# Patient Record
Sex: MALE | Race: WHITE | Employment: FULL TIME | URBAN - METROPOLITAN AREA
[De-identification: names, ages, dates, MRNs, and addresses within clinical notes are randomized per-mention and may not be internally consistent; named-entity substitution may affect disease eponyms.]

---

## 2018-10-04 ENCOUNTER — OFFICE VISIT (OUTPATIENT)
Dept: FAMILY MEDICINE CLINIC | Age: 33
End: 2018-10-04
Payer: COMMERCIAL

## 2018-10-04 ENCOUNTER — HOSPITAL ENCOUNTER (OUTPATIENT)
Age: 33
Discharge: HOME | End: 2018-10-06
Payer: COMMERCIAL

## 2018-10-04 VITALS
RESPIRATION RATE: 12 BRPM | HEIGHT: 71 IN | DIASTOLIC BLOOD PRESSURE: 76 MMHG | TEMPERATURE: 98.4 F | WEIGHT: 207 LBS | OXYGEN SATURATION: 95 % | HEART RATE: 69 BPM | BODY MASS INDEX: 28.98 KG/M2 | SYSTOLIC BLOOD PRESSURE: 134 MMHG

## 2018-10-04 DIAGNOSIS — E10.9 TYPE 1 DIABETES MELLITUS ON INSULIN THERAPY (HCC): ICD-10-CM

## 2018-10-04 DIAGNOSIS — E11.9 ENCOUNTER FOR DIABETIC FOOT EXAM (HCC): ICD-10-CM

## 2018-10-04 DIAGNOSIS — Z13.220 LIPID SCREENING: ICD-10-CM

## 2018-10-04 DIAGNOSIS — E10.9 TYPE 1 DIABETES MELLITUS ON INSULIN THERAPY (HCC): Primary | ICD-10-CM

## 2018-10-04 DIAGNOSIS — M25.521 RIGHT ELBOW PAIN: ICD-10-CM

## 2018-10-04 LAB
ALBUMIN SERPL-MCNC: 4.5 G/DL (ref 3.5–5.2)
ALP BLD-CCNC: 87 U/L (ref 40–129)
ALT SERPL-CCNC: 16 U/L (ref 0–40)
ANION GAP SERPL CALCULATED.3IONS-SCNC: 14 MMOL/L (ref 7–16)
AST SERPL-CCNC: 17 U/L (ref 0–39)
BILIRUB SERPL-MCNC: 0.8 MG/DL (ref 0–1.2)
BILIRUBIN DIRECT: <0.2 MG/DL (ref 0–0.3)
BILIRUBIN, INDIRECT: NORMAL MG/DL (ref 0–1)
BUN BLDV-MCNC: 15 MG/DL (ref 6–20)
CALCIUM SERPL-MCNC: 10.1 MG/DL (ref 8.6–10.2)
CHLORIDE BLD-SCNC: 99 MMOL/L (ref 98–107)
CHOLESTEROL, TOTAL: 204 MG/DL (ref 0–199)
CO2: 24 MMOL/L (ref 22–29)
CREAT SERPL-MCNC: 0.7 MG/DL (ref 0.7–1.2)
CREATININE URINE: 99 MG/DL (ref 40–278)
GFR AFRICAN AMERICAN: >60
GFR NON-AFRICAN AMERICAN: >60 ML/MIN/1.73
GLUCOSE BLD-MCNC: 182 MG/DL (ref 74–109)
HBA1C MFR BLD: 9.5 % (ref 4–5.6)
HDLC SERPL-MCNC: 48 MG/DL
LDL CHOLESTEROL CALCULATED: 127 MG/DL (ref 0–99)
MICROALBUMIN UR-MCNC: 16.8 MG/L
MICROALBUMIN/CREAT UR-RTO: 17 (ref 0–30)
POTASSIUM SERPL-SCNC: 4.1 MMOL/L (ref 3.5–5)
SODIUM BLD-SCNC: 137 MMOL/L (ref 132–146)
TOTAL PROTEIN: 7.5 G/DL (ref 6.4–8.3)
TRIGL SERPL-MCNC: 143 MG/DL (ref 0–149)
VLDLC SERPL CALC-MCNC: 29 MG/DL

## 2018-10-04 PROCEDURE — 99203 OFFICE O/P NEW LOW 30 MIN: CPT | Performed by: FAMILY MEDICINE

## 2018-10-04 PROCEDURE — 80048 BASIC METABOLIC PNL TOTAL CA: CPT

## 2018-10-04 PROCEDURE — 36415 COLL VENOUS BLD VENIPUNCTURE: CPT | Performed by: FAMILY MEDICINE

## 2018-10-04 PROCEDURE — 82044 UR ALBUMIN SEMIQUANTITATIVE: CPT

## 2018-10-04 PROCEDURE — 80076 HEPATIC FUNCTION PANEL: CPT

## 2018-10-04 PROCEDURE — 82570 ASSAY OF URINE CREATININE: CPT

## 2018-10-04 PROCEDURE — 80061 LIPID PANEL: CPT

## 2018-10-04 PROCEDURE — 83036 HEMOGLOBIN GLYCOSYLATED A1C: CPT

## 2018-10-04 RX ORDER — ARM BRACE
EACH MISCELLANEOUS
Qty: 1 EACH | Refills: 0 | Status: SHIPPED | OUTPATIENT
Start: 2018-10-04 | End: 2018-11-06

## 2018-10-04 ASSESSMENT — PATIENT HEALTH QUESTIONNAIRE - PHQ9
SUM OF ALL RESPONSES TO PHQ QUESTIONS 1-9: 2
SUM OF ALL RESPONSES TO PHQ9 QUESTIONS 1 & 2: 2
2. FEELING DOWN, DEPRESSED OR HOPELESS: 1
SUM OF ALL RESPONSES TO PHQ QUESTIONS 1-9: 2
1. LITTLE INTEREST OR PLEASURE IN DOING THINGS: 1

## 2018-10-04 NOTE — PROGRESS NOTES
History and Physical    Patient:   29 y.o. male MRN: <[id number]>     Date of Service: 10/4/2018      Chief complaint:    History of Present Illness   The patient is a 29 y.o. male    Chief Complaint   Patient presents with    Establish Care    Elbow Pain     x6 months     CC:DM -states that he was previously diagnosed with diabetes , c/o right elbow pain   HPI:  Diagnosed with diabetes when younger - he states he takes OTC insulin 70/30 mix  50 am and 50 pm   He states that he is also taking the OTC short acting insulin R -takes 20-25 units with meals -uses 1-2 times/day        here for care -previously a patient at [facility] saw PCP about two years ago    He states he is using a sliding scale his former endocrinologist, Dr Stanislav Beth started him on when diagnosed with juvenile diabetes   Also saw Dr Anna Parish for care of his diabetes in the past   Hospitalized two yrs ago at [facility] for DKA   Has an older glucometer-did not bring the glucometer to the visit -not checking glucoses regularly Average range of sugars is 140-180 -he will occasionally have hypoglycemia     Right elbow pain -works as a  - since starting this job this past year he has had the right elbow pain -job involves repetitive motion with the right arm/elbow   No swelling or redness of the elbow or arm     Past Medical History:  DM type 1     Past Surgical History:    Appendectomy     Allergies:  Patient has no known allergies. Social History:   TOBACCO:   reports that he has never smoked. He has never used smokeless tobacco.  ETOH:   reports that he does not drink alcohol. Family History:    Mother with bipolar disorder  Father - hx of diverticulitis   Maternal GM -colon cancer     REVIEW OF SYSTEMS:     Review of Systems - General ROS: negative for - chills, fever or weight loss  Ophthalmic ROS: negative for - blurry vision, double vision, eye pain or loss of vision  ENT ROS: negative for - nasal congestion, nasal discharge or sinus

## 2018-10-04 NOTE — PATIENT INSTRUCTIONS
Patient Education        Elbow: Exercises  Your Care Instructions  Here are some examples of exercises for elbows. Start each exercise slowly. Ease off the exercise if you start to have pain. Your doctor or physical therapist will tell you when you can start these exercises and which ones will work best for you. How to do the exercises  Wrist flexor stretch    1. Extend your arm in front of you with your palm up. 2. Bend your wrist, pointing your hand toward the floor. 3. With your other hand, gently bend your wrist farther until you feel a mild to moderate stretch in your forearm. 4. Hold for at least 15 to 30 seconds. Repeat 2 to 4 times. Wrist extensor stretch    1. Repeat steps 1 to 4 of the stretch above but begin with your extended hand palm down. Ball or sock squeeze    1. Hold a tennis ball (or a rolled-up sock) in your hand. 2. Make a fist around the ball (or sock) and squeeze. 3. Hold for about 6 seconds, and then relax for up to 10 seconds. 4. Repeat 8 to 12 times. 5. Switch the ball (or sock) to your other hand and do 8 to 12 times. Wrist deviation    1. Sit so that your arm is supported but your hand hangs off the edge of a flat surface, such as a table. 2. Hold your hand out like you are shaking hands with someone. 3. Move your hand up and down. 4. Repeat this motion 8 to 12 times. 5. Switch arms. 6. Try to do this exercise twice with each hand. Wrist curls    1. Place your forearm on a table with your hand hanging over the edge of the table, palm up. 2. Place a 1- to 2-pound weight in your hand. This may be a dumbbell, a can of food, or a filled water bottle. 3. Slowly raise and lower the weight while keeping your forearm on the table and your palm facing up. 4. Repeat this motion 8 to 12 times. 5. Switch arms, and do steps 1 through 4.  6. Repeat with your hand facing down toward the floor. Switch arms. Biceps curls    1.  Sit leaning forward with your legs slightly spread and

## 2018-11-06 ENCOUNTER — OFFICE VISIT (OUTPATIENT)
Dept: ENDOCRINOLOGY | Age: 33
End: 2018-11-06
Payer: COMMERCIAL

## 2018-11-06 VITALS
DIASTOLIC BLOOD PRESSURE: 90 MMHG | TEMPERATURE: 98.2 F | BODY MASS INDEX: 30.49 KG/M2 | SYSTOLIC BLOOD PRESSURE: 146 MMHG | WEIGHT: 213 LBS | HEIGHT: 70 IN | HEART RATE: 93 BPM

## 2018-11-06 DIAGNOSIS — E10.9 TYPE 1 DIABETES MELLITUS WITHOUT COMPLICATION (HCC): Primary | ICD-10-CM

## 2018-11-06 PROCEDURE — 99204 OFFICE O/P NEW MOD 45 MIN: CPT | Performed by: INTERNAL MEDICINE

## 2018-11-06 NOTE — LETTER
GI: No N/V/D, no constipation, No abdominal pain, no melena or hematochezia   : Denied any dysuria, hematuria, flank pain, discharge, or incontinence. Skin: denied any rash, ulcer, Hirsute, or hyperpigmentation. MSK: denied any joint deformity, joint pain/swelling, muscle pain, or back pain. Neuro: no numbness, no tingling, no weakness, _  OBJECTIVE    There were no vitals taken for this visit. BP Readings from Last 4 Encounters:   10/04/18 134/76     Wt Readings from Last 6 Encounters:   10/04/18 207 lb (93.9 kg)       Physical examination:  General: awake alert, oriented x3, no abnormal position or movements. HEENT: normocephalic non-traumatic, no exophthalmos   Neck: supple, no LN enlargement, no thyromegaly, no thyroid tenderness, no JVD. Pulm: Clear equal air entry no added sounds, no wheezing or rhonchi    CVS: S1 + S2, no murmur, no heave. Dorsalis pedis pulse palpable   Abd: soft lax, no tenderness, no organomegaly, audible bowel sounds. Skin: warm, no lesions, no rash.  No callus, no Ulcers, No acanthosis nigricans   Neuro: CN intact, Monofilament sensation normal bilateral , muscle power normal  Psych: normal mood, and affect      Review of Laboratory Data:  I have reviewed the following:  No results found for: WBC, RBC, HGB, HCT, MCV, MCH, MCHC, RDW, PLT, MPV, GRANULOCYTES, BANDS   Lab Results   Component Value Date/Time     10/04/2018 12:00 PM    K 4.1 10/04/2018 12:00 PM    CO2 24 10/04/2018 12:00 PM    BUN 15 10/04/2018 12:00 PM    CALCIUM 10.1 10/04/2018 12:00 PM      No results found for: TSH, T4FREE, X3ZXTKO, FT3, J7SVSIE, TSI, TPOABS, THGAB  Lab Results   Component Value Date    LABA1C 9.5 10/04/2018    GLUCOSE 182 10/04/2018    MALBCR 17.0 10/04/2018    LABMICR 16.8 10/04/2018    LABCREA 99 10/04/2018     Lab Results   Component Value Date    CHOL 204 10/04/2018    TRIG 143 10/04/2018    HDL 48 10/04/2018     No results found for: VITD25

## 2018-11-06 NOTE — PROGRESS NOTES
ENDOCRINOLOGY CLINIC NOTE    Date of Service: 11/6/2018    Medical Records Reviewed:   Inpatient records, outpatient records, outside records     Care Team:  Primary Care Physician: Luann Reyes DO. Provider: Ariana Landry MD  Other provider(s):            Reason for the visit:  Type I DM     Type of Visit:  Initial visit     History of Present Illness: The history is provided by the patient. No  was used. Accuracy of the patient data is excellent. Dre Spicer is a very pleasant 29 y.o. male seen in Endocrine clinic today for diabetes management     Dre Spicer was diagnosed with diabetes at age 16   The patient is currently on Relion mixed insulin 70/30 60 units before breakfast, 60 units before supper   He also has humalin R insulin at home but he is not using   Over the past 3 months, Mr. Shaan Riojas has been checking blood sugar four times/day. Unfortunately because of insurance problem he wasn't able to afford basal bolus insulin regimen. The patient reported that since started mixed 70/30 insulin BS all over the place.  Most readings are high but also reported hypoglycemic episodes especially in the afternoon   10/4/2018: A1c 9.5  Patient has had no hypoglycemic episodes or symptoms recently   The patient has been mindful of what has been eating and following diabetic diet as encouraged  I reviewed current medications and the patient has no issues with diabetes medications  Last eye exam was 3 years ago   The patient seeing performs his own foot care  Microvascular complications:  No Retinopathy, Nephropathy or Neuropathy   Macrovascular complications: no CAD, PVD, or Stroke   Refuses Flushot      PAST MEDICAL HISTORY   Past Medical History:   Diagnosis Date    Diabetes mellitus type 1 ([address]      PAST SURGICAL HISTORY   Past Surgical History:   Procedure Laterality Date    APPENDECTOMY       SOCIAL HISTORY   Social History     Social History    Marital status: Life Partner     Spouse name: N/A    Number of children: N/A    Years of education: N/A     Occupational History    Not on file. Social History Main Topics    Smoking status: Never Smoker    Smokeless tobacco: Never Used    Alcohol use No    Drug use: No    Sexual activity: Yes     Partners: Female     Other Topics Concern    Not on file     Social History Narrative    No narrative on file     FAMILY HISTORY   Family History   Problem Relation Age of Onset    Cancer Maternal Grandmother      ALLERGIES AND DRUG REACTIONS   No Known Allergies    CURRENT MEDICATIONS     Current Outpatient Prescriptions   Medication Sig Dispense Refill    insulin regular (HUMULIN R;NOVOLIN R) 100 UNIT/ML injection Inject into the skin See Admin Instructions      Elastic Bandages & Supports (ACE ELBOW BRACE) MISC To wear on right elbow 1 each 0     No current facility-administered medications for this visit. Review of Systems  Constitutional: No fever, no chills, no diaphoresis, no generalized weakness. HEENT: No blurred vision, No sore throat, no ear pain, no hair loss  Neck: denied any neck swelling, difficulty swallowing,   Cardio-pulmonary: No CP, SOB or palpitation, No orthopnea or PND. No cough or wheezing. GI: No N/V/D, no constipation, No abdominal pain, no melena or hematochezia   : Denied any dysuria, hematuria, flank pain, discharge, or incontinence. Skin: denied any rash, ulcer, Hirsute, or hyperpigmentation. MSK: denied any joint deformity, joint pain/swelling, muscle pain, or back pain. Neuro: no numbness, no tingling, no weakness, _  OBJECTIVE    There were no vitals taken for this visit. BP Readings from Last 4 Encounters:   10/04/18 134/76     Wt Readings from Last 6 Encounters:   10/04/18 207 lb (93.9 kg)       Physical examination:  General: awake alert, oriented x3, no abnormal position or movements.   HEENT: normocephalic non-traumatic, no exophthalmos   Neck: supple, no LN pre-prandial, < 180 peak post-prandial  · The patient counseled about the complications of uncontrolled diabetes   · Will change diabetic regimen to   · Patient was counselled about the importance of self-blood glucose monitoring and eating consistent carb diet to avoid blood sugar fluctuations   · Patient will need routine diabetes maintenance and prevention. Referral for ophthalmology   · The patient was referred to diabetic educator for further teaching   · Discussed lifestyle changes including diet and exercise with patient; recommended 150 minutes of moderate intensity exercise per week. · Diabetes labs before next visit     Follow up  · 3 months     The above issues were reviewed with the patient who understood and agreed with the plan. 30 minutes were spent today in management of this patient. More than 50% of time spent on counseling of patient on above diagnosis. Thank you for allowing us to participate in the care of this patient. Please do not hesitate to contact us with any additional questions. Diagnosis Orders   1.  Type 1 diabetes mellitus without complication (HCC)  Basic Metabolic Panel    Hemoglobin A1C    Microalbumin / Creatinine Urine Ratio    Lipid Panel    External Referral To Ophthalmology       Jabier Goodman MD  Endocrinologist, [facility])   [address] [place] [address], [address] [place] [address],[facility] [id number]   Phone: [phone]  Fax: [phone]  --------------------------------------------  Electronically signed

## 2018-11-21 ENCOUNTER — TELEPHONE (OUTPATIENT)
Dept: ENDOCRINOLOGY | Age: 33
End: 2018-11-21

## 2018-11-21 NOTE — TELEPHONE ENCOUNTER
Pt called his insurance. A 90day supply of Basaglar and Humalog are preferred by his insurance. He stated that you wanted him to check so that he can be switched.

## 2019-10-23 ENCOUNTER — TELEPHONE (OUTPATIENT)
Dept: ENDOCRINOLOGY | Age: 34
End: 2019-10-23

## 2019-11-19 ENCOUNTER — TELEPHONE (OUTPATIENT)
Dept: ADMINISTRATIVE | Age: 34
End: 2019-11-19

## 2019-11-19 DIAGNOSIS — E10.9 TYPE 1 DIABETES MELLITUS WITHOUT COMPLICATION (HCC): Primary | ICD-10-CM

## 2020-01-30 ENCOUNTER — OFFICE VISIT (OUTPATIENT)
Dept: ENDOCRINOLOGY | Age: 35
End: 2020-01-30
Payer: COMMERCIAL

## 2020-01-30 VITALS
RESPIRATION RATE: 16 BRPM | OXYGEN SATURATION: 98 % | HEIGHT: 71 IN | HEART RATE: 92 BPM | WEIGHT: 193 LBS | SYSTOLIC BLOOD PRESSURE: 124 MMHG | BODY MASS INDEX: 27.02 KG/M2 | DIASTOLIC BLOOD PRESSURE: 70 MMHG

## 2020-01-30 PROBLEM — Z91.199 NON-COMPLIANT PATIENT: Status: ACTIVE | Noted: 2020-01-30

## 2020-01-30 PROBLEM — E55.9 VITAMIN D DEFICIENCY: Status: ACTIVE | Noted: 2020-01-30

## 2020-01-30 LAB — HBA1C MFR BLD: 10.8 %

## 2020-01-30 PROCEDURE — 99214 OFFICE O/P EST MOD 30 MIN: CPT | Performed by: INTERNAL MEDICINE

## 2020-01-30 PROCEDURE — 83036 HEMOGLOBIN GLYCOSYLATED A1C: CPT | Performed by: INTERNAL MEDICINE

## 2020-01-30 RX ORDER — GLUCOSAMINE HCL/CHONDROITIN SU 500-400 MG
CAPSULE ORAL
Qty: 250 STRIP | Refills: 5 | Status: SHIPPED
Start: 2020-01-30 | End: 2020-05-07 | Stop reason: SDUPTHER

## 2020-01-30 RX ORDER — LANCETS 30 GAUGE
1 EACH MISCELLANEOUS 4 TIMES DAILY
Qty: 300 EACH | Refills: 5 | Status: SHIPPED
Start: 2020-01-30 | End: 2022-10-04 | Stop reason: ALTCHOICE

## 2020-01-30 RX ORDER — INSULIN LISPRO 100 [IU]/ML
INJECTION, SOLUTION INTRAVENOUS; SUBCUTANEOUS
Qty: 15 PEN | Refills: 0 | Status: SHIPPED
Start: 2020-01-30 | End: 2020-02-20

## 2020-01-30 NOTE — PROGRESS NOTES
History:   Diagnosis Date    Diabetes mellitus type 1 ([address]      PAST SURGICAL HISTORY   Past Surgical History:   Procedure Laterality Date    APPENDECTOMY       SOCIAL HISTORY   Social History     Socioeconomic History    Marital status: Life Partner     Spouse name: Not on file    Number of children: Not on file    Years of education: Not on file    Highest education level: Not on file   Occupational History    Not on file   Social Needs    Financial resource strain: Not on file    Food insecurity:     Worry: Not on file     Inability: Not on file    Transportation needs:     Medical: Not on file     Non-medical: Not on file   Tobacco Use    Smoking status: Never Smoker    Smokeless tobacco: Never Used   Substance and Sexual Activity    Alcohol use: No    Drug use: No    Sexual activity: Yes     Partners: Female   Lifestyle    Physical activity:     Days per week: Not on file     Minutes per session: Not on file    Stress: Not on file   Relationships    Social connections:     Talks on phone: Not on file     Gets together: Not on file     Attends Taoism service: Not on file     Active member of club or organization: Not on file     Attends meetings of clubs or organizations: Not on file     Relationship status: Not on file    Intimate partner violence:     Fear of current or ex partner: Not on file     Emotionally abused: Not on file     Physically abused: Not on file     Forced sexual activity: Not on file   Other Topics Concern    Not on file   Social History Narrative    Not on file     FAMILY HISTORY   Family History   Problem Relation Age of Onset    Cancer Maternal Grandmother     Hypertension Maternal Grandmother     Diabetes Paternal Grandfather      ALLERGIES AND DRUG REACTIONS   No Known Allergies    CURRENT MEDICATIONS     Current Outpatient Medications   Medication Sig Dispense Refill    insulin glargine (BASAGLAR KWIKPEN) 100 UNIT/ML injection pen 45 units at bedtime 15 pen 0    insulin lispro, 1 Unit Dial, (HUMALOG KWIKPEN) 100 UNIT/ML SOPN Inject 10 units with meals + sliding scale. MAX 50 units daily 15 pen 0    blood glucose monitor strips One-Touch Verio strips. Checks 4 times/day before meals and at bedtime and as needed for symptoms of irregular blood glucose 250 strip 5    Lancets MISC 1 each by Does not apply route 4 times daily 300 each 5     No current facility-administered medications for this visit. Review of Systems  Constitutional: No fever, no chills, no diaphoresis, no generalized weakness. HEENT: No blurred vision, No sore throat, no ear pain, no hair loss  Neck: denied any neck swelling, difficulty swallowing,   Cardio-pulmonary: No CP, SOB or palpitation, No orthopnea or PND. No cough or wheezing. GI: No N/V/D, no constipation, No abdominal pain, no melena or hematochezia   : Denied any dysuria, hematuria, flank pain, discharge, or incontinence. Skin: denied any rash, ulcer, Hirsute, or hyperpigmentation. MSK: denied any joint deformity, joint pain/swelling, muscle pain, or back pain. Neuro: no numbness, no tingling, no weakness, _  OBJECTIVE    /70 (Site: Left Upper Arm, Position: Sitting, Cuff Size: Medium Adult)   Pulse 92   Resp 16   Ht 5' 11\" (1.803 m)   Wt 193 lb (87.5 kg)   SpO2 98%   BMI 26.92 kg/m²   BP Readings from Last 4 Encounters:   01/30/20 124/70   11/06/18 (!) 146/90   10/04/18 134/76     Wt Readings from Last 6 Encounters:   01/30/20 193 lb (87.5 kg)   11/06/18 213 lb (96.6 kg)   10/04/18 207 lb (93.9 kg)       Physical examination:  General: awake alert, oriented x3, no abnormal position or movements. HEENT: normocephalic non-traumatic, no exophthalmos   Neck: supple, no LN enlargement, no thyromegaly, no thyroid tenderness, no JVD. Pulm: Clear equal air entry no added sounds, no wheezing or rhonchi    CVS: S1 + S2, no murmur, no heave.  Dorsalis pedis pulse palpable   Abd: soft lax, no tenderness, no organomegaly, audible bowel sounds. Skin: warm, no lesions, no rash. No callus, no Ulcers, No acanthosis nigricans   Neuro: CN intact, Monofilament sensation normal bilateral , muscle power normal  Psych: normal mood, and affect      Review of Laboratory Data:  I have reviewed the following:  No results found for: WBC, RBC, HGB, HCT, MCV, MCH, MCHC, RDW, PLT, MPV, GRANULOCYTES, BANDS   Lab Results   Component Value Date/Time     10/04/2018 12:00 PM    K 4.1 10/04/2018 12:00 PM    CO2 24 10/04/2018 12:00 PM    BUN 15 10/04/2018 12:00 PM    CALCIUM 10.1 10/04/2018 12:00 PM      No results found for: TSH, T4FREE, S7SJFBB, FT3, L2TABUO, TSI, TPOABS, THGAB  Lab Results   Component Value Date    LABA1C 10.8 01/30/2020    GLUCOSE 182 10/04/2018    MALBCR 17.0 10/04/2018    LABMICR 16.8 10/04/2018    LABCREA 99 10/04/2018     Lab Results   Component Value Date    CHOL 204 10/04/2018    TRIG 143 10/04/2018    HDL 48 10/04/2018     No results found for: VITD25    Medical Records/Labs/Images review:   I personally reviewed and summarized previous records   All labs were reviewed independently     [address] Jones, a 36 y.o.-old male seen in for the following issues     Diabetes Mellitus Type 1   · Patient's diabetes is uncontrolled. A1c  10.5%  · Will continue basal bolus insulin regimen. Patient understands that he is to write down his blood sugar readings and return with them in the next several weeks so adjustments can be made to his regimen. Diabetic testing supplies will be reordered for patient so there is no question that he is able to complete this task. · Take Basaglar 30 45 units daily, Humalog 10 units before meals + ss 2:50>150  · Advised to check blood sugars 4 times a day before meals and at bedtime and fax the results to our office in a week.    · Discussed with patient A1c and blood sugar goals   · Optimal blood sugars: 100-140 pre-prandial, < 180 peak post-prandial  · The patient counseled about the complications of uncontrolled diabetes   · Patient was counselled about the importance of self-blood glucose monitoring and eating consistent carb diet to avoid blood sugar fluctuations   · Patient will need routine diabetes maintenance and prevention. Referral for ophthalmology   · The patient was referred to diabetic educator for further teaching   · Discussed lifestyle changes including diet and exercise with patient; recommended 150 minutes of moderate intensity exercise per week. · Diabetes labs before next visit, including BMP and microalbumin/creatinine ratio. noncompliance   Discussed the importance of keeping his appointments (last visit with us was 11/2018)    Discussed with patient the importance of eating consistent carbohydrate meals, avoiding high glycemic index food. Also, discussed with patient the risk and negative consequences of dietary noncompliance on blood glucose control, blood pressure and weight    vitD deficiency   Encourage taking vitD 2000 U/day     Return in about 3 months (around 4/30/2020) for DM type 1 . The above issues were reviewed with the patient who understood and agreed with the plan. Thank you for allowing us to participate in the care of this patient. Please do not hesitate to contact us with any additional questions. Diagnosis Orders   1. Type 1 diabetes mellitus without complication (HCC)  POCT glycosylated hemoglobin (Hb A1C)    Basic Metabolic Panel    Hemoglobin A1C    Microalbumin / Creatinine Urine Ratio    insulin glargine (BASAGLAR KWIKPEN) 100 UNIT/ML injection pen    insulin lispro, 1 Unit Dial, (HUMALOG KWIKPEN) 100 UNIT/ML SOPN    blood glucose monitor strips   2. Vitamin D deficiency  Vitamin D 25 Hydroxy   3.  Non-compliant patient         Edward Gregory MD  Endocrinologist, [facility])   [facility] [address] [facility] [address]   Phone: [phone]  Fax: [phone]  --------------------------------------------  Electronically signed

## 2020-02-20 RX ORDER — INSULIN LISPRO 100 [IU]/ML
INJECTION, SOLUTION INTRAVENOUS; SUBCUTANEOUS
Qty: 15 ML | Refills: 5 | Status: SHIPPED
Start: 2020-02-20 | End: 2020-02-27

## 2020-02-21 ENCOUNTER — TELEPHONE (OUTPATIENT)
Dept: ENDOCRINOLOGY | Age: 35
End: 2020-02-21

## 2020-02-21 NOTE — TELEPHONE ENCOUNTER
See attached bs log       Humalog KwikPen 100 unit/ml 10 units subq with meals plus ss  Basaglar Kwikpen 100 unit/ injection pen 45 units qhs

## 2020-02-27 RX ORDER — INSULIN LISPRO 100 [IU]/ML
INJECTION, SOLUTION INTRAVENOUS; SUBCUTANEOUS
Qty: 15 PEN | Refills: 5 | Status: SHIPPED
Start: 2020-02-27 | End: 2020-05-07 | Stop reason: SDUPTHER

## 2020-02-27 RX ORDER — INSULIN LISPRO 100 [IU]/ML
INJECTION, SOLUTION INTRAVENOUS; SUBCUTANEOUS
Qty: 15 ML | Refills: 5 | Status: SHIPPED
Start: 2020-02-27 | End: 2020-02-27 | Stop reason: SDUPTHER

## 2020-05-07 ENCOUNTER — VIRTUAL VISIT (OUTPATIENT)
Dept: ENDOCRINOLOGY | Age: 35
End: 2020-05-07
Payer: COMMERCIAL

## 2020-05-07 PROCEDURE — 99214 OFFICE O/P EST MOD 30 MIN: CPT | Performed by: INTERNAL MEDICINE

## 2020-05-07 RX ORDER — INSULIN LISPRO 100 [IU]/ML
INJECTION, SOLUTION INTRAVENOUS; SUBCUTANEOUS
Qty: 30 PEN | Refills: 3 | Status: SHIPPED
Start: 2020-05-07 | End: 2020-10-08 | Stop reason: SDUPTHER

## 2020-05-07 RX ORDER — PEN NEEDLE, DIABETIC 32GX 5/32"
NEEDLE, DISPOSABLE MISCELLANEOUS
Qty: 250 EACH | Refills: 5 | Status: SHIPPED
Start: 2020-05-07 | End: 2020-10-08 | Stop reason: SDUPTHER

## 2020-05-07 RX ORDER — INSULIN GLARGINE 100 [IU]/ML
INJECTION, SOLUTION SUBCUTANEOUS
Qty: 25 PEN | Refills: 3 | Status: SHIPPED
Start: 2020-05-07 | End: 2020-10-08 | Stop reason: SDUPTHER

## 2020-05-07 RX ORDER — GLUCOSAMINE HCL/CHONDROITIN SU 500-400 MG
CAPSULE ORAL
Qty: 250 STRIP | Refills: 5 | Status: SHIPPED
Start: 2020-05-07 | End: 2020-10-08 | Stop reason: SDUPTHER

## 2020-05-07 NOTE — PROGRESS NOTES
ENDOCRINOLOGY CLINIC NOTE    Date of Service: 5/7/2020    Primary Care Physician: Erna Villanueva DO. Provider: Rubén Weaver MD    Reason for the visit:  Type I DM     History of Present Illness: The history is provided by the patient. No  was used. Accuracy of the patient data is excellent. Dre George is a very pleasant 36 y.o. male seen today for diabetes management     Dre Kirkland was diagnosed with diabetes at age 16   The patient is currently on basaglar 61 , Humalog 25 units + ss 3:50>150  He has been checking BS 1-2  times a day and missing insulin doses frequently. Per pt  most readings 160-250  Lab Results   Component Value Date    LABA1C 10.8 01/30/2020    LABA1C 9.5 10/04/2018     The patient has not been mindful of what has been eating and following diabetic diet as encouraged  I reviewed current medications and the patient has no issues with diabetes medications  Last eye exam was multiple years ago; patient recently was given by insurance from his job. The patient seeing his without a podiatrist and performs his own foot care  Microvascular complications:  No Retinopathy, Nephropathy or Neuropathy   Macrovascular complications: no CAD, PVD, or Stroke   Refuses Flushot      PAST MEDICAL HISTORY   Past Medical History:   Diagnosis Date    Diabetes mellitus type 1 ([address]      PAST SURGICAL HISTORY   Past Surgical History:   Procedure Laterality Date    APPENDECTOMY       SOCIAL HISTORY   Tobacco:   reports that he has never smoked. He has never used smokeless tobacco.  Alcol:   reports no history of alcohol use. Illicit Drugs:   reports no history of drug use.     FAMILY HISTORY   Family History   Problem Relation Age of Onset    Cancer Maternal Grandmother     Hypertension Maternal Grandmother     Diabetes Paternal Grandfather      ALLERGIES AND DRUG REACTIONS   No Known Allergies    CURRENT MEDICATIONS     Current Outpatient Medications   Medication Sig mass. No obvious neck deformity     CVS: no distress   Chest: no distress. Chest is moving with respiration    Extremities:  no visible tremor  Skin: No visible rashes as seen from camera   Musculoskeletal: no visible deformity  Neuro: Alert and oriented to person, place, and time. Psychiatric: Normal mood and affect. Behavior is normal    Review of Laboratory Data:  I have reviewed the following:  No results found for: WBC, RBC, HGB, HCT, MCV, MCH, MCHC, RDW, PLT, MPV, GRANULOCYTES, BANDS   Lab Results   Component Value Date/Time     10/04/2018 12:00 PM    K 4.1 10/04/2018 12:00 PM    CO2 24 10/04/2018 12:00 PM    BUN 15 10/04/2018 12:00 PM    CALCIUM 10.1 10/04/2018 12:00 PM      No results found for: TSH, T4FREE, Y9PEUHI, FT3, H0HDNTC, TSI, TPOABS, THGAB  Lab Results   Component Value Date    LABA1C 10.8 01/30/2020    GLUCOSE 182 10/04/2018    MALBCR 17.0 10/04/2018    LABMICR 16.8 10/04/2018    LABCREA 99 10/04/2018     Lab Results   Component Value Date    CHOL 204 10/04/2018    TRIG 143 10/04/2018    HDL 48 10/04/2018     No results found for: VITD25    Medical Records/Labs/Images review:   I personally reviewed and summarized previous records   All labs were reviewed independently     1814 Daiana Jones, a 36 y.o.-old male seen in for the following issues     Diabetes Mellitus Type 1   · Patient's diabetes is uncontrolled. A1c  10.5%, due to poor compliance with diet and insulin therapy   · Discussed the importance of taking insulin as prescribed and following diabetic diet   · continue  basaglar 60 U daily , Humalog 25 units + ss 3:50>150  · Advised to check blood sugars 4 times a day before meals and at bedtime and fax the results to our office in a week.    · Discussed with patient A1c and blood sugar goals   · Optimal blood sugars: 100-140 pre-prandial, < 180 peak post-prandial  · The patient counseled about the complications of uncontrolled diabetes   · Patient was

## 2020-05-07 NOTE — PROGRESS NOTES
Dre Colon was read the following message We want to confirm that, for purposes of billing, this is a virtual visit with your provider for which we will submit a claim for reimbursement with your insurance company. You will be responsible for any copays, coinsurance amounts or other amounts not covered by your insurance company. If you do not accept this, unfortunately we will not be able to schedule a virtual visit with the provider. Do you accept?  Dre DORADO responded YES

## 2020-07-21 ENCOUNTER — APPOINTMENT (OUTPATIENT)
Dept: CT IMAGING | Age: 35
End: 2020-07-21
Payer: COMMERCIAL

## 2020-07-21 ENCOUNTER — HOSPITAL ENCOUNTER (EMERGENCY)
Age: 35
Discharge: HOME | End: 2020-07-22
Attending: EMERGENCY MEDICINE
Payer: COMMERCIAL

## 2020-07-21 VITALS
RESPIRATION RATE: 16 BRPM | WEIGHT: 210 LBS | HEIGHT: 71 IN | TEMPERATURE: 97.3 F | HEART RATE: 88 BPM | BODY MASS INDEX: 29.4 KG/M2 | DIASTOLIC BLOOD PRESSURE: 94 MMHG | OXYGEN SATURATION: 97 % | SYSTOLIC BLOOD PRESSURE: 176 MMHG

## 2020-07-21 LAB
ALBUMIN SERPL-MCNC: 4.3 G/DL (ref 3.5–5.2)
ALP BLD-CCNC: 79 U/L (ref 40–129)
ALT SERPL-CCNC: 15 U/L (ref 0–40)
ANION GAP SERPL CALCULATED.3IONS-SCNC: 19 MMOL/L (ref 7–16)
AST SERPL-CCNC: 20 U/L (ref 0–39)
BACTERIA: ABNORMAL /HPF
BASOPHILS ABSOLUTE: 0.07 E9/L (ref 0–0.2)
BASOPHILS RELATIVE PERCENT: 0.4 % (ref 0–2)
BETA-HYDROXYBUTYRATE: 1.55 MMOL/L (ref 0.02–0.27)
BILIRUB SERPL-MCNC: 2.1 MG/DL (ref 0–1.2)
BILIRUBIN URINE: ABNORMAL
BLOOD, URINE: NEGATIVE
BUN BLDV-MCNC: 21 MG/DL (ref 6–20)
CALCIUM SERPL-MCNC: 9.8 MG/DL (ref 8.6–10.2)
CHLORIDE BLD-SCNC: 94 MMOL/L (ref 98–107)
CHP ED QC CHECK: YES
CLARITY: ABNORMAL
CO2: 23 MMOL/L (ref 22–29)
COLOR: ABNORMAL
CREAT SERPL-MCNC: 0.8 MG/DL (ref 0.7–1.2)
EOSINOPHILS ABSOLUTE: 0.04 E9/L (ref 0.05–0.5)
EOSINOPHILS RELATIVE PERCENT: 0.2 % (ref 0–6)
GFR AFRICAN AMERICAN: >60
GFR NON-AFRICAN AMERICAN: >60 ML/MIN/1.73
GLUCOSE BLD-MCNC: 233 MG/DL
GLUCOSE BLD-MCNC: 260 MG/DL (ref 74–99)
GLUCOSE URINE: 100 MG/DL
HCT VFR BLD CALC: 45.5 % (ref 37–54)
HEMOGLOBIN: 16.3 G/DL (ref 12.5–16.5)
IMMATURE GRANULOCYTES #: 0.11 E9/L
IMMATURE GRANULOCYTES %: 0.6 % (ref 0–5)
KETONES, URINE: >=80 MG/DL
LEUKOCYTE ESTERASE, URINE: NEGATIVE
LIPASE: 46 U/L (ref 13–60)
LYMPHOCYTES ABSOLUTE: 2.37 E9/L (ref 1.5–4)
LYMPHOCYTES RELATIVE PERCENT: 12.8 % (ref 20–42)
MCH RBC QN AUTO: 30.8 PG (ref 26–35)
MCHC RBC AUTO-ENTMCNC: 35.8 % (ref 32–34.5)
MCV RBC AUTO: 85.8 FL (ref 80–99.9)
METER GLUCOSE: 223 MG/DL (ref 74–99)
MONOCYTES ABSOLUTE: 1.42 E9/L (ref 0.1–0.95)
MONOCYTES RELATIVE PERCENT: 7.7 % (ref 2–12)
MUCUS: PRESENT /LPF
NEUTROPHILS ABSOLUTE: 14.51 E9/L (ref 1.8–7.3)
NEUTROPHILS RELATIVE PERCENT: 78.3 % (ref 43–80)
NITRITE, URINE: NEGATIVE
PDW BLD-RTO: 12.2 FL (ref 11.5–15)
PH UA: 6 (ref 5–9)
PH VENOUS: 7.53 (ref 7.35–7.45)
PLATELET # BLD: 216 E9/L (ref 130–450)
PMV BLD AUTO: 11.6 FL (ref 7–12)
POTASSIUM SERPL-SCNC: 3.4 MMOL/L (ref 3.5–5)
PROTEIN UA: 30 MG/DL
RBC # BLD: 5.3 E12/L (ref 3.8–5.8)
RBC UA: ABNORMAL /HPF (ref 0–2)
SODIUM BLD-SCNC: 136 MMOL/L (ref 132–146)
SPECIFIC GRAVITY UA: 1.02 (ref 1–1.03)
TOTAL PROTEIN: 7.6 G/DL (ref 6.4–8.3)
UROBILINOGEN, URINE: 0.2 E.U./DL
WBC # BLD: 18.5 E9/L (ref 4.5–11.5)
WBC UA: ABNORMAL /HPF (ref 0–5)

## 2020-07-21 PROCEDURE — 96375 TX/PRO/DX INJ NEW DRUG ADDON: CPT

## 2020-07-21 PROCEDURE — 83690 ASSAY OF LIPASE: CPT

## 2020-07-21 PROCEDURE — 99284 EMERGENCY DEPT VISIT MOD MDM: CPT

## 2020-07-21 PROCEDURE — 96374 THER/PROPH/DIAG INJ IV PUSH: CPT

## 2020-07-21 PROCEDURE — 6360000002 HC RX W HCPCS: Performed by: EMERGENCY MEDICINE

## 2020-07-21 PROCEDURE — 2580000003 HC RX 258: Performed by: EMERGENCY MEDICINE

## 2020-07-21 PROCEDURE — 80053 COMPREHEN METABOLIC PANEL: CPT

## 2020-07-21 PROCEDURE — 81001 URINALYSIS AUTO W/SCOPE: CPT

## 2020-07-21 PROCEDURE — 82800 BLOOD PH: CPT

## 2020-07-21 PROCEDURE — 74176 CT ABD & PELVIS W/O CONTRAST: CPT

## 2020-07-21 PROCEDURE — 85025 COMPLETE CBC W/AUTO DIFF WBC: CPT

## 2020-07-21 PROCEDURE — 82010 KETONE BODYS QUAN: CPT

## 2020-07-21 RX ORDER — METOCLOPRAMIDE HYDROCHLORIDE 5 MG/ML
10 INJECTION INTRAMUSCULAR; INTRAVENOUS ONCE
Status: COMPLETED | OUTPATIENT
Start: 2020-07-21 | End: 2020-07-22

## 2020-07-21 RX ORDER — ONDANSETRON 2 MG/ML
8 INJECTION INTRAMUSCULAR; INTRAVENOUS ONCE
Status: COMPLETED | OUTPATIENT
Start: 2020-07-21 | End: 2020-07-21

## 2020-07-21 RX ORDER — DIPHENHYDRAMINE HYDROCHLORIDE 50 MG/ML
25 INJECTION INTRAMUSCULAR; INTRAVENOUS ONCE
Status: DISCONTINUED | OUTPATIENT
Start: 2020-07-21 | End: 2020-07-21

## 2020-07-21 RX ORDER — KETOROLAC TROMETHAMINE 30 MG/ML
30 INJECTION, SOLUTION INTRAMUSCULAR; INTRAVENOUS ONCE
Status: COMPLETED | OUTPATIENT
Start: 2020-07-21 | End: 2020-07-21

## 2020-07-21 RX ORDER — 0.9 % SODIUM CHLORIDE 0.9 %
1000 INTRAVENOUS SOLUTION INTRAVENOUS ONCE
Status: COMPLETED | OUTPATIENT
Start: 2020-07-21 | End: 2020-07-22

## 2020-07-21 RX ORDER — 0.9 % SODIUM CHLORIDE 0.9 %
1000 INTRAVENOUS SOLUTION INTRAVENOUS ONCE
Status: COMPLETED | OUTPATIENT
Start: 2020-07-21 | End: 2020-07-21

## 2020-07-21 RX ORDER — DICYCLOMINE HYDROCHLORIDE 10 MG/ML
20 INJECTION INTRAMUSCULAR ONCE
Status: COMPLETED | OUTPATIENT
Start: 2020-07-21 | End: 2020-07-22

## 2020-07-21 RX ADMIN — SODIUM CHLORIDE 1000 ML: 9 INJECTION, SOLUTION INTRAVENOUS at 23:13

## 2020-07-21 RX ADMIN — ONDANSETRON 8 MG: 2 INJECTION INTRAMUSCULAR; INTRAVENOUS at 20:45

## 2020-07-21 RX ADMIN — KETOROLAC TROMETHAMINE 30 MG: 30 INJECTION, SOLUTION INTRAMUSCULAR at 20:45

## 2020-07-21 RX ADMIN — SODIUM CHLORIDE 1000 ML: 9 INJECTION, SOLUTION INTRAVENOUS at 20:45

## 2020-07-21 ASSESSMENT — PAIN SCALES - GENERAL: PAINLEVEL_OUTOF10: 7

## 2020-07-21 ASSESSMENT — ENCOUNTER SYMPTOMS
WHEEZING: 0
NAUSEA: 1
VOMITING: 1
EYE DISCHARGE: 0
COUGH: 0
EYE REDNESS: 0
BACK PAIN: 0
SINUS PRESSURE: 0
SHORTNESS OF BREATH: 0
SORE THROAT: 0
DIARRHEA: 1
ABDOMINAL PAIN: 1
EYE PAIN: 0

## 2020-07-21 ASSESSMENT — PAIN DESCRIPTION - LOCATION: LOCATION: ABDOMEN

## 2020-07-21 ASSESSMENT — PAIN DESCRIPTION - PAIN TYPE: TYPE: ACUTE PAIN

## 2020-07-22 PROCEDURE — 96375 TX/PRO/DX INJ NEW DRUG ADDON: CPT

## 2020-07-22 PROCEDURE — 6360000002 HC RX W HCPCS: Performed by: EMERGENCY MEDICINE

## 2020-07-22 PROCEDURE — 96372 THER/PROPH/DIAG INJ SC/IM: CPT

## 2020-07-22 RX ORDER — ONDANSETRON 4 MG/1
4 TABLET, FILM COATED ORAL 3 TIMES DAILY PRN
Qty: 15 TABLET | Refills: 0 | Status: SHIPPED | OUTPATIENT
Start: 2020-07-22 | End: 2022-10-04 | Stop reason: ALTCHOICE

## 2020-07-22 RX ORDER — SUCRALFATE ORAL 1 G/10ML
1 SUSPENSION ORAL 4 TIMES DAILY
Qty: 1200 ML | Refills: 0 | Status: SHIPPED | OUTPATIENT
Start: 2020-07-22 | End: 2020-10-08

## 2020-07-22 RX ADMIN — DICYCLOMINE HYDROCHLORIDE 20 MG: 20 INJECTION, SOLUTION INTRAMUSCULAR at 00:15

## 2020-07-22 RX ADMIN — METOCLOPRAMIDE 10 MG: 5 INJECTION, SOLUTION INTRAMUSCULAR; INTRAVENOUS at 00:15

## 2020-07-22 NOTE — ED PROVIDER NOTES
Department of Emergency Medicine   ED  Provider Note  Admit Date/RoomTime: 7/21/2020  8:12 PM  ED Room: SYBIL/SYBIL              7/21/20  8:27 PM EDT    History of Present Illness:   Trell Kessler is a 30 y.o. male presenting to the ED for vomiting diarrhea, beginning last night. The complaint has been persistent, moderate in severity, and worsened by nothing. Patient reports his emesis has been nonbilious nonbloody. He reports he has been able to tolerate very little oral intake since onset and therefore has not been taking his insulin since onset. Patient does report he had DKA 3 years ago and this feels different. He also complains of cramping bilateral lower abdominal pain. He reports he is still taking his insulin despite his poor oral intake but has scaled back into two thirds of his basal.       Review of Systems:   Pertinent positives and negatives are stated within HPI, all other systems reviewed and are negative. Review of Systems   Constitutional: Negative for chills and fever. HENT: Negative for ear pain, sinus pressure and sore throat. Eyes: Negative for pain, discharge and redness. Respiratory: Negative for cough, shortness of breath and wheezing. Cardiovascular: Negative for chest pain. Gastrointestinal: Positive for abdominal pain, diarrhea, nausea and vomiting. Genitourinary: Negative for dysuria and frequency. Musculoskeletal: Negative for arthralgias and back pain. Skin: Negative for rash and wound. Neurological: Negative for weakness and headaches. Hematological: Negative for adenopathy. All other systems reviewed and are negative.             --------------------------------------------- PAST HISTORY ---------------------------------------------  Past Medical History:  has a past medical history of Diabetes mellitus type 1 [facility] Past Surgical History:  has a past surgical history that includes Appendectomy.     Social History:  reports that he has never 4.3 3.5 - 5.2 g/dL    Total Bilirubin 2.1 (H) 0.0 - 1.2 mg/dL    Alkaline Phosphatase 79 40 - 129 U/L    ALT 15 0 - 40 U/L    AST 20 0 - 39 U/L   pH, venous   Result Value Ref Range    pH, Gaurav 7.53 (H) 7.35 - 7.45   Beta-Hydroxybutyrate   Result Value Ref Range    Beta-Hydroxybutyrate 1.55 (H) 0.02 - 0.27 mmol/L   Lipase   Result Value Ref Range    Lipase 46 13 - 60 U/L   Urinalysis with Microscopic   Result Value Ref Range    Color, UA BLAYNE (A) Straw/Yellow    Clarity, UA SL CLOUDY Clear    Glucose, Ur 100 (A) Negative mg/dL    Bilirubin Urine MODERATE (A) Negative    Ketones, Urine >=80 (A) Negative mg/dL    Specific Gravity, UA 1.025 1.005 - 1.030    Blood, Urine Negative Negative    pH, UA 6.0 5.0 - 9.0    Protein, UA 30 (A) Negative mg/dL    Urobilinogen, Urine 0.2 <2.0 E.U./dL    Nitrite, Urine Negative Negative    Leukocyte Esterase, Urine Negative Negative    Mucus, UA Present (A) None Seen /LPF    WBC, UA 1-3 0 - 5 /HPF    RBC, UA NONE 0 - 2 /HPF    Bacteria, UA RARE (A) None Seen /HPF   POCT glucose   Result Value Ref Range    Glucose 233 mg/dL    QC OK? yes    POCT Glucose   Result Value Ref Range    Meter Glucose 223 (H) 74 - 99 mg/dL       RADIOLOGY:  Interpreted by Radiologist.  CT ABDOMEN PELVIS WO CONTRAST Additional Contrast? None   Final Result   Unremarkable CT scan abdomen and pelvis without contrast.            ------------------------- NURSING NOTES AND VITALS REVIEWED ---------------------------   The nursing notes within the ED encounter and vital signs as below have been reviewed. BP (!) 176/94   Pulse 88   Temp 97.3 °F (36.3 °C) (Temporal)   Resp 16   Ht 5' 11\" (1.803 m)   Wt 210 lb (95.3 kg)   SpO2 97%   BMI 29.29 kg/m²   Oxygen Saturation Interpretation: Normal      ---------------------------------------------------PHYSICAL EXAM--------------------------------------    Physical Exam  Vitals signs and nursing note reviewed.    Constitutional:       Appearance: He is well-developed. HENT:      Head: Normocephalic and atraumatic. Eyes:      Conjunctiva/sclera: Conjunctivae normal.   Neck:      Musculoskeletal: Normal range of motion and neck supple. Cardiovascular:      Rate and Rhythm: Normal rate and regular rhythm. Heart sounds: Normal heart sounds. No murmur. Pulmonary:      Effort: Pulmonary effort is normal. No respiratory distress. Breath sounds: Normal breath sounds. No wheezing or rales. Abdominal:      General: Bowel sounds are normal.      Palpations: Abdomen is soft. Tenderness: There is no abdominal tenderness. There is no guarding or rebound. Musculoskeletal:         General: No tenderness or deformity. Skin:     General: Skin is warm and dry. Neurological:      Mental Status: He is alert and oriented to person, place, and time. Cranial Nerves: No cranial nerve deficit. Coordination: Coordination normal.            ------------------------------ ED COURSE/MEDICAL DECISION MAKING----------------------  Medications   0.9 % sodium chloride bolus (0 mLs Intravenous Stopped 7/21/20 2220)   ondansetron (ZOFRAN) injection 8 mg (8 mg Intravenous Given 7/21/20 2045)   ketorolac (TORADOL) injection 30 mg (30 mg Intravenous Given 7/21/20 2045)   0.9 % sodium chloride bolus (0 mLs Intravenous Stopped 7/22/20 0108)   dicyclomine (BENTYL) injection 20 mg (20 mg Intramuscular Given 7/22/20 0015)   metoclopramide (REGLAN) injection 10 mg (10 mg Intravenous Given 7/22/20 0015)       ED Course as of Jul 22 1747   Tue Jul 21, 2020   2145 Although patient's beta hydroxybutyrate is elevated and does have a slight anion gap patient is not acidotic with a venous pH of 7.5 and a normal CO2, therefore patient is not in DKA at this time    [MF]   2313 Still with cramping to abdomen of abdomen remain soft. Bentyl ordered. Patient still receiving second bolus of fluid    [MF]   Wed Jul 22, 2020   1339 Patient is feeling better at this time.  Patient will be discharged home with zofran and carafate and will follow up with his PCP. [AL]      ED Course User Index  [AL] Brit Cochran DO  [MF] Palmer Scott DO          Medical Decision Makin-year-old male with a history of type 1 diabetes presents for 2 days of nausea vomiting diarrhea. Patient elevated leukocytosis likely this is a stress response that cannot find an infectious source despite patient abdominal pain he has no tenderness and CAT scan is unremarkable, urinalysis is unremarkable. Patient is not in DKA likely because he has actually been taking his insulin despite the illness. Patient given symptomatic care. Urged to continue taking his insulin to prevent going into DKA. Patient expresses understanding. Counseling: The emergency provider has spoken with the patient and discussed todays results, in addition to providing specific details for the plan of care and counseling regarding the diagnosis and prognosis. Questions are answered at this time and they are agreeable with the plan.      --------------------------------- IMPRESSION AND DISPOSITION ---------------------------------    IMPRESSION  1. Nausea vomiting and diarrhea    2. Abdominal pain, unspecified abdominal location        DISPOSITION  Disposition: Discharge to home  Patient condition is stable      NOTE: This report was transcribed using voice recognition software.  Every effort was made to ensure accuracy; however, inadvertent computerized transcription errors may be present         Palmer Scott DO  20 1354

## 2020-07-22 NOTE — ED NOTES
Discharge instructions given and patient verbalized understanding     Alberto Buck RN  92/23/91 [id number]

## 2020-10-08 ENCOUNTER — OFFICE VISIT (OUTPATIENT)
Dept: ENDOCRINOLOGY | Age: 35
End: 2020-10-08
Payer: COMMERCIAL

## 2020-10-08 VITALS
OXYGEN SATURATION: 98 % | RESPIRATION RATE: 16 BRPM | TEMPERATURE: 97.6 F | DIASTOLIC BLOOD PRESSURE: 78 MMHG | WEIGHT: 220.4 LBS | BODY MASS INDEX: 30.74 KG/M2 | HEART RATE: 85 BPM | SYSTOLIC BLOOD PRESSURE: 128 MMHG

## 2020-10-08 LAB — HBA1C MFR BLD: 7.3 %

## 2020-10-08 PROCEDURE — 99214 OFFICE O/P EST MOD 30 MIN: CPT | Performed by: INTERNAL MEDICINE

## 2020-10-08 PROCEDURE — 3051F HG A1C>EQUAL 7.0%<8.0%: CPT | Performed by: INTERNAL MEDICINE

## 2020-10-08 PROCEDURE — 83036 HEMOGLOBIN GLYCOSYLATED A1C: CPT | Performed by: INTERNAL MEDICINE

## 2020-10-08 RX ORDER — INSULIN LISPRO 100 [IU]/ML
INJECTION, SOLUTION INTRAVENOUS; SUBCUTANEOUS
Qty: 30 PEN | Refills: 3 | Status: SHIPPED
Start: 2020-10-08 | End: 2021-05-26

## 2020-10-08 RX ORDER — GLUCOSAMINE HCL/CHONDROITIN SU 500-400 MG
CAPSULE ORAL
Qty: 250 STRIP | Refills: 5 | Status: SHIPPED
Start: 2020-10-08 | End: 2022-10-04 | Stop reason: ALTCHOICE

## 2020-10-08 RX ORDER — INSULIN GLARGINE 100 [IU]/ML
INJECTION, SOLUTION SUBCUTANEOUS
Qty: 30 PEN | Refills: 3 | Status: SHIPPED
Start: 2020-10-08 | End: 2020-11-24 | Stop reason: SDUPTHER

## 2020-10-08 RX ORDER — PEN NEEDLE, DIABETIC 32GX 5/32"
NEEDLE, DISPOSABLE MISCELLANEOUS
Qty: 250 EACH | Refills: 5 | Status: SHIPPED
Start: 2020-10-08 | End: 2022-10-03 | Stop reason: SDUPTHER

## 2020-10-08 NOTE — PROGRESS NOTES
[address] [place] [address] Department of Endocrinology Diabetes and Metabolism   [address] [facility] [address]   Phone: [phone]  Fax: [phone]    Date of Service: 10/8/2020    Primary Care Physician: Celine Marc DO. Provider: Victor M Coe MD    Reason for the visit:  Type I DM     History of Present Illness: The history is provided by the patient. No  was used. Accuracy of the patient data is excellent. Dre Hernandez is a very pleasant 30 y.o. male seen today for diabetes management     Dre Hernandez was diagnosed with diabetes at age 16 and he is currently on basaglar 61 , Humalog 15  units + ss 3:50>150  He has been checking BS 1-3 times a day and is more compliant with his insulin regimen after fixing insurance issues. Per pt  most readings     Lab Results   Component Value Date    LABA1C 7.3 10/08/2020    LABA1C 10.8 01/30/2020    LABA1C 9.5 10/04/2018     The patient has not been mindful of what has been eating and following diabetic diet as encouraged  I reviewed current medications and the patient has no issues with diabetes medications  Last eye exam was multiple years ago; patient recently was given by insurance from his job. The patient seeing his without a podiatrist and performs his own foot care  Microvascular complications:  No Retinopathy, Nephropathy or Neuropathy   Macrovascular complications: no CAD, PVD, or Stroke   Refuses Flushot      PAST MEDICAL HISTORY   Past Medical History:   Diagnosis Date    Diabetes mellitus type 1 ([address]      PAST SURGICAL HISTORY   Past Surgical History:   Procedure Laterality Date    APPENDECTOMY       SOCIAL HISTORY   Tobacco:   reports that he has never smoked. He has never used smokeless tobacco.  Alcol:   reports no history of alcohol use. Illicit Drugs:   reports no history of drug use.     FAMILY HISTORY   Family History   Problem Relation Age of Onset    Cancer Maternal Grandmother  Hypertension Maternal Grandmother     Diabetes Paternal Grandfather      ALLERGIES AND DRUG REACTIONS   No Known Allergies    CURRENT MEDICATIONS     Current Outpatient Medications   Medication Sig Dispense Refill    insulin glargine (BASAGLAR KWIKPEN) 100 UNIT/ML injection pen 60 units at bedtime 30 pen 3    insulin lispro, 1 Unit Dial, 100 UNIT/ML SOPN INJECT 12-16 UNITS SUBCUTANEOUSLY WITH MEALS PLUS  AS  PER  SLIDING  SCALE  -  MAX  OF  100 UNITS  DAILY 30 pen 3    Insulin Pen Needle (BD PEN NEEDLE DRAKE U/F) 32G X 4 MM MISC Uses with insulin 4 times a day 250 each 5    blood glucose monitor strips One-Touch mini strips. Checks 5 times/day before meals and at bedtime and as needed for symptoms of irregular blood glucose 250 strip 5    ondansetron (ZOFRAN) 4 MG tablet Take 1 tablet by mouth 3 times daily as needed for Nausea or Vomiting 15 tablet 0    Lancets MISC 1 each by Does not apply route 4 times daily 300 each 5     No current facility-administered medications for this visit. Review of Systems  Constitutional: No fever, no chills, no diaphoresis, no generalized weakness. HEENT: No blurred vision, No sore throat, no ear pain, no hair loss  Neck: denied any neck swelling, difficulty swallowing,   Cardio-pulmonary: No CP, SOB or palpitation, No orthopnea or PND. No cough or wheezing. GI: No N/V/D, no constipation, No abdominal pain, no melena or hematochezia   : Denied any dysuria, hematuria, flank pain, discharge, or incontinence. Skin: denied any rash, ulcer, Hirsute, or hyperpigmentation. MSK: denied any joint deformity, joint pain/swelling, muscle pain, or back pain.   Neuro: no numbness, no tingling, no weakness, _  OBJECTIVE    /78 (Site: Right Upper Arm, Position: Sitting, Cuff Size: Medium Adult)   Pulse 85   Temp 97.6 °F (36.4 °C) (Temporal)   Resp 16   Wt 220 lb 6.4 oz (100 kg)   SpO2 98%   BMI 30.74 kg/m²   BP Readings from Last 4 Encounters:   10/08/20 128/78 07/21/20 (!) 176/94   01/30/20 124/70   11/06/18 (!) 146/90     Wt Readings from Last 6 Encounters:   10/08/20 220 lb 6.4 oz (100 kg)   07/21/20 210 lb (95.3 kg)   01/30/20 193 lb (87.5 kg)   11/06/18 213 lb (96.6 kg)   10/04/18 207 lb (93.9 kg)       Physical examination:  General: awake alert, oriented x3, no abnormal position or movements. HEENT: normocephalic non-traumatic, no exophthalmos   Neck: supple, no LN enlargement, no thyromegaly, no thyroid tenderness, no JVD. Pulm: Clear equal air entry no added sounds, no wheezing or rhonchi    CVS: S1 + S2, no murmur, no heave. Dorsalis pedis pulse palpable   Abd: soft lax, no tenderness, no organomegaly, audible bowel sounds. Skin: warm, no lesions, no rash.  No callus, no Ulcers, No acanthosis nigricans   Neuro: CN intact, Monofilament sensation normal bilateral , muscle power normal  Psych: normal mood, and affect      Review of Laboratory Data:  I have reviewed the following:  Lab Results   Component Value Date/Time    WBC 18.5 (H) 07/21/2020 08:40 PM    RBC 5.30 07/21/2020 08:40 PM    HGB 16.3 07/21/2020 08:40 PM    HCT 45.5 07/21/2020 08:40 PM    MCV 85.8 07/21/2020 08:40 PM    MCH 30.8 07/21/2020 08:40 PM    MCHC 35.8 (H) 07/21/2020 08:40 PM    RDW 12.2 07/21/2020 08:40 PM     07/21/2020 08:40 PM    MPV 11.6 07/21/2020 08:40 PM      Lab Results   Component Value Date/Time     07/21/2020 08:40 PM    K 3.4 (L) 07/21/2020 08:40 PM    CO2 23 07/21/2020 08:40 PM    BUN 21 (H) 07/21/2020 08:40 PM    CALCIUM 9.8 07/21/2020 08:40 PM      No results found for: TSH, T4FREE, T5ZUSKO, FT3, K0RGJCY, TSI, TPOABS, THGAB  Lab Results   Component Value Date    LABA1C 7.3 10/08/2020    GLUCOSE 260 07/21/2020    MALBCR 17.0 10/04/2018    LABMICR 16.8 10/04/2018    LABCREA 99 10/04/2018     Lab Results   Component Value Date    CHOL 204 10/04/2018    TRIG 143 10/04/2018    HDL 48 10/04/2018     No results found for: VITD25    Medical Records/Labs/Images review: I personally reviewed and summarized previous records   All labs were reviewed independently     1814 Daiana Jones, a 30 y.o.-old male seen in for the following issues     Diabetes Mellitus Type 1   · Patient's diabetes is controlled. A1c  7.3%  · Continue  basaglar 60 U daily , Humalog 15 units + ss 3:50>150  · Discussed the importance of taking insulin as prescribed and following diabetic diet   · Advised to check blood sugars 4 times a day before meals and at bedtime and fax the results to our office in a week. · Discussed with patient A1c and blood sugar goals   · Patient will need routine diabetes maintenance and prevention     noncompliance  · Discussed with patient the importance of eating consistent carbohydrate meals, avoiding high glycemic index food. Also, discussed with patient the risk and negative consequences of dietary noncompliance on blood glucose control, blood pressure and weight     vitD deficiency   · Continue vitD supplement     Return in about 4 months (around 2/8/2021) for DM type 1 . The above issues were reviewed with the patient who understood and agreed with the plan. Thank you for allowing us to participate in the care of this patient. Please do not hesitate to contact us with any additional questions. Diagnosis Orders   1. Type 1 diabetes mellitus without complication (HCC)  POCT glycosylated hemoglobin (Hb A1C)    insulin glargine (BASAGLAR KWIKPEN) 100 UNIT/ML injection pen    insulin lispro, 1 Unit Dial, 100 UNIT/ML SOPN    Insulin Pen Needle (BD PEN NEEDLE DRAKE U/F) 32G X 4 MM MISC    blood glucose monitor strips    Basic Metabolic Panel    Hemoglobin A1C    Microalbumin / Creatinine Urine Ratio    TSH without Reflex    Lipid Panel   2. Vitamin D deficiency  Vitamin D 25 Hydroxy   3.  Non-compliant patient         Yasmani Tirado MD  Endocrinologist, [address]   [address] [facility] [address]   Phone: [phone]  Fax:

## 2020-10-08 NOTE — LETTER
[address] [place] [address] Department of Endocrinology Diabetes and Metabolism   [address] [place] [address]   Phone: [phone]  Fax: [phone]      Provider: Drew Juarez MD  Primary Care Physician: Kurt Varela DO   Referring Provider: No ref. provider found    Patient: Osman Larose  YOB: 1985  Date of Visit: 10/8/2020      Dear Dr. Kurt Varela DO   I had the pleasure of seeing your patient Osman Larose today at endocrine clinic for follow up visit and I enclosed a copy of the office visit completed today. Thank you very much for asking us to participate in the care of this very pleasant patient. Please don't hesitate to call if there are any further questions or concerns. Sincerely   Drew Juarez MD  Endocrinologist, [address] [address]   Phone: [phone]  Fax: [phone]      [address] [place] [address] Department of Endocrinology Diabetes and Metabolism   [address] [place] [address]   Phone: [phone]  Fax: [phone]    Date of Service: 10/8/2020    Primary Care Physician: Kurt Varela DO. Provider: Drew Juarez MD    Reason for the visit:  Type I DM     History of Present Illness: The history is provided by the patient. No  was used. Accuracy of the patient data is excellent. Dre Todd is a very pleasant 30 y.o. male seen today for diabetes management     Dre Todd was diagnosed with diabetes at age 16 and he is currently on basaglar 61 , Humalog 15  units + ss 3:50>150  He has been checking BS 1-3 times a day and is more compliant with his insulin regimen after fixing insurance issues.  Per pt  most readings     Lab Results   Component Value Date    LABA1C 7.3 10/08/2020    LABA1C 10.8 01/30/2020    LABA1C 9.5 10/04/2018     The patient has not been mindful of what has been eating and following diabetic diet as encouraged I reviewed current medications and the patient has no issues with diabetes medications  Last eye exam was multiple years ago; patient recently was given by insurance from his job. The patient seeing his without a podiatrist and performs his own foot care  Microvascular complications:  No Retinopathy, Nephropathy or Neuropathy   Macrovascular complications: no CAD, PVD, or Stroke   Refuses Flushot      PAST MEDICAL HISTORY   Past Medical History:   Diagnosis Date    Diabetes mellitus type 1 ([address]      PAST SURGICAL HISTORY   Past Surgical History:   Procedure Laterality Date    APPENDECTOMY       SOCIAL HISTORY   Tobacco:   reports that he has never smoked. He has never used smokeless tobacco.  Alcol:   reports no history of alcohol use. Illicit Drugs:   reports no history of drug use. FAMILY HISTORY   Family History   Problem Relation Age of Onset    Cancer Maternal Grandmother     Hypertension Maternal Grandmother     Diabetes Paternal Grandfather      ALLERGIES AND DRUG REACTIONS   No Known Allergies    CURRENT MEDICATIONS     Current Outpatient Medications   Medication Sig Dispense Refill    insulin glargine (BASAGLAR KWIKPEN) 100 UNIT/ML injection pen 60 units at bedtime 30 pen 3    insulin lispro, 1 Unit Dial, 100 UNIT/ML SOPN INJECT 12-16 UNITS SUBCUTANEOUSLY WITH MEALS PLUS  AS  PER  SLIDING  SCALE  -  MAX  OF  100 UNITS  DAILY 30 pen 3    Insulin Pen Needle (BD PEN NEEDLE DRAKE U/F) 32G X 4 MM MISC Uses with insulin 4 times a day 250 each 5    blood glucose monitor strips One-Touch mini strips. Checks 5 times/day before meals and at bedtime and as needed for symptoms of irregular blood glucose 250 strip 5    ondansetron (ZOFRAN) 4 MG tablet Take 1 tablet by mouth 3 times daily as needed for Nausea or Vomiting 15 tablet 0    Lancets MISC 1 each by Does not apply route 4 times daily 300 each 5     No current facility-administered medications for this visit.         Review of Systems Constitutional: No fever, no chills, no diaphoresis, no generalized weakness. HEENT: No blurred vision, No sore throat, no ear pain, no hair loss  Neck: denied any neck swelling, difficulty swallowing,   Cardio-pulmonary: No CP, SOB or palpitation, No orthopnea or PND. No cough or wheezing. GI: No N/V/D, no constipation, No abdominal pain, no melena or hematochezia   : Denied any dysuria, hematuria, flank pain, discharge, or incontinence. Skin: denied any rash, ulcer, Hirsute, or hyperpigmentation. MSK: denied any joint deformity, joint pain/swelling, muscle pain, or back pain. Neuro: no numbness, no tingling, no weakness, _  OBJECTIVE    /78 (Site: Right Upper Arm, Position: Sitting, Cuff Size: Medium Adult)   Pulse 85   Temp 97.6 °F (36.4 °C) (Temporal)   Resp 16   Wt 220 lb 6.4 oz (100 kg)   SpO2 98%   BMI 30.74 kg/m²   BP Readings from Last 4 Encounters:   10/08/20 128/78   07/21/20 (!) 176/94   01/30/20 124/70   11/06/18 (!) 146/90     Wt Readings from Last 6 Encounters:   10/08/20 220 lb 6.4 oz (100 kg)   07/21/20 210 lb (95.3 kg)   01/30/20 193 lb (87.5 kg)   11/06/18 213 lb (96.6 kg)   10/04/18 207 lb (93.9 kg)       Physical examination:  General: awake alert, oriented x3, no abnormal position or movements. HEENT: normocephalic non-traumatic, no exophthalmos   Neck: supple, no LN enlargement, no thyromegaly, no thyroid tenderness, no JVD. Pulm: Clear equal air entry no added sounds, no wheezing or rhonchi    CVS: S1 + S2, no murmur, no heave. Dorsalis pedis pulse palpable   Abd: soft lax, no tenderness, no organomegaly, audible bowel sounds. Skin: warm, no lesions, no rash.  No callus, no Ulcers, No acanthosis nigricans   Neuro: CN intact, Monofilament sensation normal bilateral , muscle power normal  Psych: normal mood, and affect      Review of Laboratory Data:  I have reviewed the following:  Lab Results   Component Value Date/Time    WBC 18.5 (H) 07/21/2020 08:40 PM RBC 5.30 07/21/2020 08:40 PM    HGB 16.3 07/21/2020 08:40 PM    HCT 45.5 07/21/2020 08:40 PM    MCV 85.8 07/21/2020 08:40 PM    MCH 30.8 07/21/2020 08:40 PM    MCHC 35.8 (H) 07/21/2020 08:40 PM    RDW 12.2 07/21/2020 08:40 PM     07/21/2020 08:40 PM    MPV 11.6 07/21/2020 08:40 PM      Lab Results   Component Value Date/Time     07/21/2020 08:40 PM    K 3.4 (L) 07/21/2020 08:40 PM    CO2 23 07/21/2020 08:40 PM    BUN 21 (H) 07/21/2020 08:40 PM    CALCIUM 9.8 07/21/2020 08:40 PM      No results found for: TSH, T4FREE, J6SHPLA, FT3, Y7HALLM, TSI, TPOABS, THGAB  Lab Results   Component Value Date    LABA1C 7.3 10/08/2020    GLUCOSE 260 07/21/2020    MALBCR 17.0 10/04/2018    LABMICR 16.8 10/04/2018    LABCREA 99 10/04/2018     Lab Results   Component Value Date    CHOL 204 10/04/2018    TRIG 143 10/04/2018    HDL 48 10/04/2018     No results found for: VITD25    Medical Records/Labs/Images review:   I personally reviewed and summarized previous records   All labs were reviewed independently     1814 Daiana Jones, a 30 y.o.-old male seen in for the following issues     Diabetes Mellitus Type 1   · Patient's diabetes is controlled. A1c  7.3%  · Continue  basaglar 60 U daily , Humalog 15 units + ss 3:50>150  · Discussed the importance of taking insulin as prescribed and following diabetic diet   · Advised to check blood sugars 4 times a day before meals and at bedtime and fax the results to our office in a week. · Discussed with patient A1c and blood sugar goals   · Patient will need routine diabetes maintenance and prevention     noncompliance  · Discussed with patient the importance of eating consistent carbohydrate meals, avoiding high glycemic index food.  Also, discussed with patient the risk and negative consequences of dietary noncompliance on blood glucose control, blood pressure and weight     vitD deficiency   · Continue vitD supplement Return in about 4 months (around 2/8/2021) for DM type 1 . The above issues were reviewed with the patient who understood and agreed with the plan. Thank you for allowing us to participate in the care of this patient. Please do not hesitate to contact us with any additional questions. Diagnosis Orders   1. Type 1 diabetes mellitus without complication (HCC)  POCT glycosylated hemoglobin (Hb A1C)    insulin glargine (BASAGLAR KWIKPEN) 100 UNIT/ML injection pen    insulin lispro, 1 Unit Dial, 100 UNIT/ML SOPN    Insulin Pen Needle (BD PEN NEEDLE DRAKE U/F) 32G X 4 MM MISC    blood glucose monitor strips    Basic Metabolic Panel    Hemoglobin A1C    Microalbumin / Creatinine Urine Ratio    TSH without Reflex    Lipid Panel   2. Vitamin D deficiency  Vitamin D 25 Hydroxy   3.  Non-compliant patient         Castro Ley MD  Endocrinologist, [address] [address]   Phone: [phone]  Fax: [phone]  --------------------------------------------  Electronically signed by Reanna Giles MD on 10/8/2020 at 10:10 AM

## 2020-11-18 ENCOUNTER — TELEPHONE (OUTPATIENT)
Dept: ENDOCRINOLOGY | Age: 35
End: 2020-11-18

## 2020-11-18 NOTE — TELEPHONE ENCOUNTER
Patient's partner called says patient has been having daily bouts of emesis every morning for over 6 weeks. She says he is eating properly and taking medication as directed. Patient told her his blood sugar levels have been in normal range. He is not scheduled to be seen until February. Please advise.  Thank you

## 2020-11-24 ENCOUNTER — TELEPHONE (OUTPATIENT)
Dept: ENDOCRINOLOGY | Age: 35
End: 2020-11-24

## 2020-11-24 ENCOUNTER — OFFICE VISIT (OUTPATIENT)
Dept: ENDOCRINOLOGY | Age: 35
End: 2020-11-24
Payer: COMMERCIAL

## 2020-11-24 VITALS
DIASTOLIC BLOOD PRESSURE: 68 MMHG | SYSTOLIC BLOOD PRESSURE: 124 MMHG | HEART RATE: 74 BPM | BODY MASS INDEX: 29.85 KG/M2 | OXYGEN SATURATION: 98 % | WEIGHT: 214 LBS | TEMPERATURE: 98.4 F

## 2020-11-24 PROCEDURE — 3051F HG A1C>EQUAL 7.0%<8.0%: CPT | Performed by: INTERNAL MEDICINE

## 2020-11-24 PROCEDURE — 99214 OFFICE O/P EST MOD 30 MIN: CPT | Performed by: INTERNAL MEDICINE

## 2020-11-24 RX ORDER — INSULIN GLARGINE 100 [IU]/ML
INJECTION, SOLUTION SUBCUTANEOUS
Qty: 30 PEN | Refills: 3 | Status: SHIPPED
Start: 2020-11-24 | End: 2022-10-03

## 2020-11-24 NOTE — PROGRESS NOTES
[address] [place] [address] Department of Endocrinology Diabetes and Metabolism   [address] [facility] [address]   Phone: [phone]  Fax: [phone]    Date of Service: 11/24/2020    Primary Care Physician: Keyana Sequeira DO. Provider: Giuseppe Negro MD    Reason for the visit:  Type I DM     History of Present Illness: The history is provided by the patient. No  was used. Accuracy of the patient data is excellent. Kurtis Janyce Goldberg is a very pleasant 30 y.o. male seen today for diabetes management     Kurtis Janyce Goldberg was diagnosed with diabetes at age 16 and he is currently on basaglar 61 , Humalog 15  units + ss 3:50>150  He has been checking BS 1-3 times a day and is more compliant with his insulin regimen after fixing insurance issues. Per pt  most readings     Lab Results   Component Value Date    LABA1C 7.3 10/08/2020    LABA1C 10.8 01/30/2020    LABA1C 9.5 10/04/2018     The patient has not been mindful of what has been eating and following diabetic diet as encouraged  I reviewed current medications and the patient has no issues with diabetes medications  Last eye exam was multiple years ago; patient recently was given by insurance from his job. The patient seeing his without a podiatrist and performs his own foot care  Microvascular complications:  No Retinopathy, Nephropathy or Neuropathy   Macrovascular complications: no CAD, PVD, or Stroke   Refuses Flushot      PAST MEDICAL HISTORY   Past Medical History:   Diagnosis Date    Diabetes mellitus type 1 ([address]      PAST SURGICAL HISTORY   Past Surgical History:   Procedure Laterality Date    APPENDECTOMY       SOCIAL HISTORY   Tobacco:   reports that he has never smoked. He has never used smokeless tobacco.  Alcol:   reports no history of alcohol use. Illicit Drugs:   reports no history of drug use.     FAMILY HISTORY   Family History   Problem Relation Age of Onset    Cancer Maternal Grandmother  Hypertension Maternal Grandmother     Diabetes Paternal Grandfather      ALLERGIES AND DRUG REACTIONS   No Known Allergies    CURRENT MEDICATIONS     Current Outpatient Medications   Medication Sig Dispense Refill    insulin glargine (BASAGLAR KWIKPEN) 100 UNIT/ML injection pen 60 units at bedtime 30 pen 3    insulin lispro, 1 Unit Dial, 100 UNIT/ML SOPN INJECT 12-16 UNITS SUBCUTANEOUSLY WITH MEALS PLUS  AS  PER  SLIDING  SCALE  -  MAX  OF  100 UNITS  DAILY 30 pen 3    Insulin Pen Needle (BD PEN NEEDLE DRAKE U/F) 32G X 4 MM MISC Uses with insulin 4 times a day 250 each 5    blood glucose monitor strips One-Touch mini strips. Checks 5 times/day before meals and at bedtime and as needed for symptoms of irregular blood glucose 250 strip 5    ondansetron (ZOFRAN) 4 MG tablet Take 1 tablet by mouth 3 times daily as needed for Nausea or Vomiting 15 tablet 0    Lancets MISC 1 each by Does not apply route 4 times daily 300 each 5     No current facility-administered medications for this visit. Review of Systems  Constitutional: No fever, no chills, no diaphoresis, no generalized weakness. HEENT: No blurred vision, No sore throat, no ear pain, no hair loss  Neck: denied any neck swelling, difficulty swallowing,   Cardio-pulmonary: No CP, SOB or palpitation, No orthopnea or PND. No cough or wheezing. GI: No N/V/D, no constipation, No abdominal pain, no melena or hematochezia   : Denied any dysuria, hematuria, flank pain, discharge, or incontinence. Skin: denied any rash, ulcer, Hirsute, or hyperpigmentation. MSK: denied any joint deformity, joint pain/swelling, muscle pain, or back pain.   Neuro: no numbness, no tingling, no weakness, _  OBJECTIVE    /68   Pulse 74   Temp 98.4 °F (36.9 °C)   Wt 214 lb (97.1 kg)   SpO2 98%   BMI 29.85 kg/m²   BP Readings from Last 4 Encounters:   11/24/20 124/68   10/08/20 128/78   07/21/20 (!) 176/94   01/30/20 124/70     Wt Readings from Last 6 Encounters: 11/24/20 214 lb (97.1 kg)   10/08/20 220 lb 6.4 oz (100 kg)   07/21/20 210 lb (95.3 kg)   01/30/20 193 lb (87.5 kg)   11/06/18 213 lb (96.6 kg)   10/04/18 207 lb (93.9 kg)       Physical examination:  General: awake alert, oriented x3, no abnormal position or movements. HEENT: normocephalic non-traumatic, no exophthalmos   Neck: supple, no LN enlargement, no thyromegaly, no thyroid tenderness, no JVD. Pulm: Clear equal air entry no added sounds, no wheezing or rhonchi    CVS: S1 + S2, no murmur, no heave. Dorsalis pedis pulse palpable   Abd: soft lax, no tenderness, no organomegaly, audible bowel sounds. Skin: warm, no lesions, no rash.  No callus, no Ulcers, No acanthosis nigricans   Neuro: CN intact, Monofilament sensation normal bilateral , muscle power normal  Psych: normal mood, and affect      Review of Laboratory Data:  I have reviewed the following:  Lab Results   Component Value Date/Time    WBC 18.5 (H) 07/21/2020 08:40 PM    RBC 5.30 07/21/2020 08:40 PM    HGB 16.3 07/21/2020 08:40 PM    HCT 45.5 07/21/2020 08:40 PM    MCV 85.8 07/21/2020 08:40 PM    MCH 30.8 07/21/2020 08:40 PM    MCHC 35.8 (H) 07/21/2020 08:40 PM    RDW 12.2 07/21/2020 08:40 PM     07/21/2020 08:40 PM    MPV 11.6 07/21/2020 08:40 PM      Lab Results   Component Value Date/Time     07/21/2020 08:40 PM    K 3.4 (L) 07/21/2020 08:40 PM    CO2 23 07/21/2020 08:40 PM    BUN 21 (H) 07/21/2020 08:40 PM    CALCIUM 9.8 07/21/2020 08:40 PM      No results found for: TSH, T4FREE, L0RLUEU, FT3, T2APDNR, TSI, TPOABS, THGAB  Lab Results   Component Value Date    LABA1C 7.3 10/08/2020    GLUCOSE 260 07/21/2020    MALBCR 17.0 10/04/2018    LABMICR 16.8 10/04/2018    LABCREA 99 10/04/2018     Lab Results   Component Value Date    CHOL 204 10/04/2018    TRIG 143 10/04/2018    HDL 48 10/04/2018     No results found for: VITD25    Medical Records/Labs/Images review:   I personally reviewed and summarized previous records   All labs were reviewed independently     ASSESSMENT & RECOMMENDATIONS   Dre Ibrahim Devi, a 30 y.o.-old male seen in for the following issues     Diabetes Mellitus Type 1   · Patient's diabetes is controlled. A1c  7.3%  · Change insulin regimen to  basaglar 55 U daily , Humalog 15 units + ss 3:50>150  · Discussed the importance of taking insulin as prescribed and following diabetic diet   · Advised to check blood sugars 4 times a day before meals and at bedtime and fax the results to our office in a week. · Discussed with patient A1c and blood sugar goals   · Patient will need routine diabetes maintenance and prevention     Noncompliance  · Discussed with patient the importance of eating consistent carbohydrate meals, avoiding high glycemic index food. Also, discussed with patient the risk and negative consequences of dietary noncompliance on blood glucose control, blood pressure and weight     vitD deficiency   · Continue vitD supplement     Return in about 4 months (around 3/24/2021) for DM type 1 . The above issues were reviewed with the patient who understood and agreed with the plan. Thank you for allowing us to participate in the care of this patient. Please do not hesitate to contact us with any additional questions. Diagnosis Orders   1. Type 1 diabetes mellitus without complication (HCC)  insulin glargine (BASAGLAR KWIKPEN) 100 UNIT/ML injection pen    Continuous Blood Gluc  (FREESTYLE JORDAN 2 READER SYSTM) SAMANTA    Continuous Blood Gluc Sensor (FREESTYLE JORDAN 2 SENSOR SYSTM) MISC   2. Vitamin D deficiency     3. Non-compliant patient     4. Altered bowel function  External Referral To Gastroenterology       Alison Alcazar MD  Endocrinologist, [address] [address]   Phone: [phone]  Fax: [phone]  --------------------------------------------  An electronic signature was used to authenticate this note.  Dudley Corral MD on 11/24/2020 at 7:29 PM

## 2022-07-30 ENCOUNTER — APPOINTMENT (OUTPATIENT)
Dept: GENERAL RADIOLOGY | Age: 37
End: 2022-07-30
Payer: COMMERCIAL

## 2022-07-30 ENCOUNTER — HOSPITAL ENCOUNTER (EMERGENCY)
Age: 37
Discharge: HOME | End: 2022-07-30
Attending: EMERGENCY MEDICINE
Payer: COMMERCIAL

## 2022-07-30 VITALS
DIASTOLIC BLOOD PRESSURE: 82 MMHG | BODY MASS INDEX: 26.48 KG/M2 | HEART RATE: 87 BPM | WEIGHT: 185 LBS | SYSTOLIC BLOOD PRESSURE: 118 MMHG | OXYGEN SATURATION: 99 % | HEIGHT: 70 IN | RESPIRATION RATE: 16 BRPM | TEMPERATURE: 98.1 F

## 2022-07-30 DIAGNOSIS — R11.2 NAUSEA AND VOMITING, INTRACTABILITY OF VOMITING NOT SPECIFIED, UNSPECIFIED VOMITING TYPE: Primary | ICD-10-CM

## 2022-07-30 DIAGNOSIS — R42 LIGHTHEADEDNESS: ICD-10-CM

## 2022-07-30 DIAGNOSIS — E10.65 TYPE 1 DIABETES MELLITUS WITH HYPERGLYCEMIA (HCC): ICD-10-CM

## 2022-07-30 LAB
ALBUMIN SERPL-MCNC: 4.3 G/DL (ref 3.5–5.2)
ALP BLD-CCNC: 98 U/L (ref 40–129)
ALT SERPL-CCNC: 21 U/L (ref 0–40)
ANION GAP SERPL CALCULATED.3IONS-SCNC: 15 MMOL/L (ref 7–16)
AST SERPL-CCNC: 20 U/L (ref 0–39)
BASOPHILS ABSOLUTE: 0.11 E9/L (ref 0–0.2)
BASOPHILS RELATIVE PERCENT: 0.6 % (ref 0–2)
BETA-HYDROXYBUTYRATE: 0.36 MMOL/L (ref 0.02–0.27)
BILIRUB SERPL-MCNC: 0.7 MG/DL (ref 0–1.2)
BUN BLDV-MCNC: 15 MG/DL (ref 6–20)
CALCIUM SERPL-MCNC: 9.2 MG/DL (ref 8.6–10.2)
CHLORIDE BLD-SCNC: 101 MMOL/L (ref 98–107)
CO2: 19 MMOL/L (ref 22–29)
CREAT SERPL-MCNC: 0.7 MG/DL (ref 0.7–1.2)
EKG ATRIAL RATE: 50 BPM
EKG P AXIS: 32 DEGREES
EKG P-R INTERVAL: 116 MS
EKG Q-T INTERVAL: 430 MS
EKG QRS DURATION: 100 MS
EKG QTC CALCULATION (BAZETT): 392 MS
EKG R AXIS: 83 DEGREES
EKG T AXIS: 49 DEGREES
EKG VENTRICULAR RATE: 50 BPM
EOSINOPHILS ABSOLUTE: 0.11 E9/L (ref 0.05–0.5)
EOSINOPHILS RELATIVE PERCENT: 0.6 % (ref 0–6)
GFR AFRICAN AMERICAN: >60
GFR NON-AFRICAN AMERICAN: >60 ML/MIN/1.73
GLUCOSE BLD-MCNC: 296 MG/DL (ref 74–99)
HCT VFR BLD CALC: 46.8 % (ref 37–54)
HEMOGLOBIN: 16.6 G/DL (ref 12.5–16.5)
IMMATURE GRANULOCYTES #: 0.1 E9/L
IMMATURE GRANULOCYTES %: 0.6 % (ref 0–5)
LACTIC ACID: 1.9 MMOL/L (ref 0.5–2.2)
LYMPHOCYTES ABSOLUTE: 2.37 E9/L (ref 1.5–4)
LYMPHOCYTES RELATIVE PERCENT: 13.4 % (ref 20–42)
MCH RBC QN AUTO: 31 PG (ref 26–35)
MCHC RBC AUTO-ENTMCNC: 35.5 % (ref 32–34.5)
MCV RBC AUTO: 87.5 FL (ref 80–99.9)
METER GLUCOSE: 268 MG/DL (ref 74–99)
MONOCYTES ABSOLUTE: 0.92 E9/L (ref 0.1–0.95)
MONOCYTES RELATIVE PERCENT: 5.2 % (ref 2–12)
NEUTROPHILS ABSOLUTE: 14.06 E9/L (ref 1.8–7.3)
NEUTROPHILS RELATIVE PERCENT: 79.6 % (ref 43–80)
PDW BLD-RTO: 12 FL (ref 11.5–15)
PH VENOUS: 7.36 (ref 7.35–7.45)
PLATELET # BLD: 178 E9/L (ref 130–450)
PMV BLD AUTO: 10.8 FL (ref 7–12)
POTASSIUM REFLEX MAGNESIUM: 3.8 MMOL/L (ref 3.5–5)
RBC # BLD: 5.35 E12/L (ref 3.8–5.8)
SARS-COV-2, NAAT: NOT DETECTED
SODIUM BLD-SCNC: 135 MMOL/L (ref 132–146)
TOTAL PROTEIN: 7.3 G/DL (ref 6.4–8.3)
TROPONIN, HIGH SENSITIVITY: <6 NG/L (ref 0–11)
WBC # BLD: 17.7 E9/L (ref 4.5–11.5)

## 2022-07-30 PROCEDURE — 2580000003 HC RX 258: Performed by: PHYSICIAN ASSISTANT

## 2022-07-30 PROCEDURE — 80053 COMPREHEN METABOLIC PANEL: CPT

## 2022-07-30 PROCEDURE — 84484 ASSAY OF TROPONIN QUANT: CPT

## 2022-07-30 PROCEDURE — 82010 KETONE BODYS QUAN: CPT

## 2022-07-30 PROCEDURE — 96374 THER/PROPH/DIAG INJ IV PUSH: CPT

## 2022-07-30 PROCEDURE — 82800 BLOOD PH: CPT

## 2022-07-30 PROCEDURE — 99285 EMERGENCY DEPT VISIT HI MDM: CPT

## 2022-07-30 PROCEDURE — 83605 ASSAY OF LACTIC ACID: CPT

## 2022-07-30 PROCEDURE — 87635 SARS-COV-2 COVID-19 AMP PRB: CPT

## 2022-07-30 PROCEDURE — 71046 X-RAY EXAM CHEST 2 VIEWS: CPT

## 2022-07-30 PROCEDURE — 6360000002 HC RX W HCPCS: Performed by: PHYSICIAN ASSISTANT

## 2022-07-30 PROCEDURE — 93005 ELECTROCARDIOGRAM TRACING: CPT | Performed by: PHYSICIAN ASSISTANT

## 2022-07-30 PROCEDURE — 85025 COMPLETE CBC W/AUTO DIFF WBC: CPT

## 2022-07-30 RX ORDER — 0.9 % SODIUM CHLORIDE 0.9 %
1000 INTRAVENOUS SOLUTION INTRAVENOUS ONCE
Status: COMPLETED | OUTPATIENT
Start: 2022-07-30 | End: 2022-07-30

## 2022-07-30 RX ORDER — ONDANSETRON 4 MG/1
4 TABLET, ORALLY DISINTEGRATING ORAL EVERY 8 HOURS PRN
Qty: 12 TABLET | Refills: 0 | Status: SHIPPED | OUTPATIENT
Start: 2022-07-30 | End: 2022-10-04 | Stop reason: ALTCHOICE

## 2022-07-30 RX ORDER — ONDANSETRON 2 MG/ML
4 INJECTION INTRAMUSCULAR; INTRAVENOUS ONCE
Status: COMPLETED | OUTPATIENT
Start: 2022-07-30 | End: 2022-07-30

## 2022-07-30 RX ADMIN — SODIUM CHLORIDE 1000 ML: 9 INJECTION, SOLUTION INTRAVENOUS at 10:14

## 2022-07-30 RX ADMIN — ONDANSETRON 4 MG: 2 INJECTION INTRAMUSCULAR; INTRAVENOUS at 12:56

## 2022-07-30 RX ADMIN — SODIUM CHLORIDE 1000 ML: 9 INJECTION, SOLUTION INTRAVENOUS at 12:14

## 2022-07-30 ASSESSMENT — PAIN - FUNCTIONAL ASSESSMENT: PAIN_FUNCTIONAL_ASSESSMENT: NONE - DENIES PAIN

## 2022-07-30 NOTE — Clinical Note
Dre Bullock was seen and treated in our emergency department on 7/30/2022. He may return to work on 08/01/2022. If you have any questions or concerns, please don't hesitate to call.       Ana Mcclelland PA-C

## 2022-07-30 NOTE — ED PROVIDER NOTES
that he has never smoked. He has never used smokeless tobacco. He reports that he does not drink alcohol and does not use drugs. Family History: family history includes Cancer in his maternal grandmother; Diabetes in his paternal grandfather; Hypertension in his maternal grandmother. The patients home medications have been reviewed. Allergies: Patient has no known allergies.     --------------------------------- RESULTS ------------------------------------------  All laboratory and radiology results have been personally reviewed by myself   LABS:  Results for orders placed or performed during the hospital encounter of 07/30/22   COVID-19, Rapid    Specimen: Nasopharyngeal Swab   Result Value Ref Range    SARS-CoV-2, NAAT Not Detected Not Detected   CBC with Auto Differential   Result Value Ref Range    WBC 17.7 (H) 4.5 - 11.5 E9/L    RBC 5.35 3.80 - 5.80 E12/L    Hemoglobin 16.6 (H) 12.5 - 16.5 g/dL    Hematocrit 46.8 37.0 - 54.0 %    MCV 87.5 80.0 - 99.9 fL    MCH 31.0 26.0 - 35.0 pg    MCHC 35.5 (H) 32.0 - 34.5 %    RDW 12.0 11.5 - 15.0 fL    Platelets 033 515 - 419 E9/L    MPV 10.8 7.0 - 12.0 fL    Neutrophils % 79.6 43.0 - 80.0 %    Immature Granulocytes % 0.6 0.0 - 5.0 %    Lymphocytes % 13.4 (L) 20.0 - 42.0 %    Monocytes % 5.2 2.0 - 12.0 %    Eosinophils % 0.6 0.0 - 6.0 %    Basophils % 0.6 0.0 - 2.0 %    Neutrophils Absolute 14.06 (H) 1.80 - 7.30 E9/L    Immature Granulocytes # 0.10 E9/L    Lymphocytes Absolute 2.37 1.50 - 4.00 E9/L    Monocytes Absolute 0.92 0.10 - 0.95 E9/L    Eosinophils Absolute 0.11 0.05 - 0.50 E9/L    Basophils Absolute 0.11 0.00 - 0.20 E9/L   Comprehensive Metabolic Panel w/ Reflex to MG   Result Value Ref Range    Sodium 135 132 - 146 mmol/L    Potassium reflex Magnesium 3.8 3.5 - 5.0 mmol/L    Chloride 101 98 - 107 mmol/L    CO2 19 (L) 22 - 29 mmol/L    Anion Gap 15 7 - 16 mmol/L    Glucose 296 (H) 74 - 99 mg/dL    BUN 15 6 - 20 mg/dL    Creatinine 0.7 0.7 - 1.2 mg/dL GFR Non-African American >60 >=60 mL/min/1.73    GFR African American >60     Calcium 9.2 8.6 - 10.2 mg/dL    Total Protein 7.3 6.4 - 8.3 g/dL    Albumin 4.3 3.5 - 5.2 g/dL    Total Bilirubin 0.7 0.0 - 1.2 mg/dL    Alkaline Phosphatase 98 40 - 129 U/L    ALT 21 0 - 40 U/L    AST 20 0 - 39 U/L   Troponin   Result Value Ref Range    Troponin, High Sensitivity <6 0 - 11 ng/L   pH, venous   Result Value Ref Range    pH, Gaurav 7.36 7.35 - 7.45   Beta-Hydroxybutyrate   Result Value Ref Range    Beta-Hydroxybutyrate 0.36 (H) 0.02 - 0.27 mmol/L   Lactic Acid   Result Value Ref Range    Lactic Acid 1.9 0.5 - 2.2 mmol/L   POCT Glucose   Result Value Ref Range    Meter Glucose 268 (H) 74 - 99 mg/dL   EKG 12 Lead   Result Value Ref Range    Ventricular Rate 50 BPM    Atrial Rate 50 BPM    P-R Interval 116 ms    QRS Duration 100 ms    Q-T Interval 430 ms    QTc Calculation (Bazett) 392 ms    P Axis 32 degrees    R Axis 83 degrees    T Axis 49 degrees       RADIOLOGY:  Interpreted by Radiologist.  XR CHEST (2 VW)   Final Result   Mild prominence of perihilar lung markings likely trace central vascular   congestion without focal consolidation.             ----------------- NURSING NOTES AND VITALS REVIEWED ---------------   The nursing notes within the ED encounter and vital signs as below have been reviewed. /82   Pulse 87   Temp 98.1 °F (36.7 °C) (Temporal)   Resp 16   Ht 5' 10\" (1.778 m)   Wt 185 lb (83.9 kg)   SpO2 99%   BMI 26.54 kg/m²   Oxygen Saturation Interpretation: Normal      --------------------------------PHYSICAL EXAM------------------------------------      Constitutional/General: Alert and oriented x3, ill appearing, diaphoretic  Head: NC/AT  Eyes: PERRL, EOMI  Mouth: Oropharynx clear, handling secretions, no trismus  Neck: Supple, full ROM, no meningeal signs  Pulmonary: Lungs clear to auscultation bilaterally, no wheezes, rales, or rhonchi.  Not in respiratory distress  Cardiovascular:  Regular rate and rhythm, no murmurs, gallops, or rubs. 2+ distal pulses  Abdomen: Soft, + BS. No distension. Nontender. No palpable rigidity, rebound or guarding  Extremities: Moves all extremities x 4. Warm and well perfused  Skin: warm and dry without rash  Neurologic: GCS 15,  Intact. No focal deficits  Psych: Normal Affect      ------------------------ ED COURSE/MEDICAL DECISION MAKING----------------------  Medications   0.9 % sodium chloride bolus (0 mLs IntraVENous Stopped 7/30/22 1116)   0.9 % sodium chloride bolus (0 mLs IntraVENous Stopped 7/30/22 1256)   ondansetron (ZOFRAN) injection 4 mg (4 mg IntraVENous Given 7/30/22 1256)         Medical Decision Making:    Seen and evaluated with Dr. Urban Hernandez. Pt does have elevated blood sugar but he is not in DKA. Has been given 2 L fluids in department with Zofran. Feeling better after meds. Covid testing negative. CXR clear. Pt needs to reestablish care with his endocrinologist.   Given slip for work. PT aware and agreeable to plan. ED Course as of 07/30/22 1505   Sat Jul 30, 2022   1036 EKG: This EKG is signed and interpreted by me. Rate: 50  Rhythm: Sinus  Interpretation: no acute changes and sinus bradycardia  Comparison: no previous   [TG]      ED Course User Index  [TG] Kimberly Donovan DO       Counseling: The emergency provider has spoken with the patient and discussed todays results, in addition to providing specific details for the plan of care and counseling regarding the diagnosis and prognosis. Questions are answered at this time and they are agreeable with the plan.      ------------------------ IMPRESSION AND DISPOSITION -------------------------------    IMPRESSION  1. Nausea and vomiting, intractability of vomiting not specified, unspecified vomiting type    2. Lightheadedness    3.  Type 1 diabetes mellitus with hyperglycemia (HCC)        DISPOSITION  Disposition: Discharge to home  Patient condition is stable Lon Gilliland PA-C  07/30/22 1500

## 2022-09-30 ENCOUNTER — TELEPHONE (OUTPATIENT)
Dept: ENDOCRINOLOGY | Age: 37
End: 2022-09-30

## 2022-09-30 NOTE — TELEPHONE ENCOUNTER
Pt called in to office very upset and tearful stating that he knows he hasn't been seen in 2 years but he is reaching out because he is struggling with managing his diabetes. He has been on Novolin 70/30: 50 units at night and Novolin R: 20 units with meals. Pt is in need of refills. I asked him to bring in his blood sugar logs in order for us to do anything. He said he will bring them in today. I made him an appt with jesus on Monday 10/3.

## 2022-10-03 ENCOUNTER — OFFICE VISIT (OUTPATIENT)
Dept: ENDOCRINOLOGY | Age: 37
End: 2022-10-03
Payer: COMMERCIAL

## 2022-10-03 VITALS
DIASTOLIC BLOOD PRESSURE: 68 MMHG | WEIGHT: 177 LBS | HEIGHT: 70 IN | BODY MASS INDEX: 25.34 KG/M2 | SYSTOLIC BLOOD PRESSURE: 106 MMHG | HEART RATE: 73 BPM

## 2022-10-03 DIAGNOSIS — E55.9 VITAMIN D DEFICIENCY: ICD-10-CM

## 2022-10-03 DIAGNOSIS — E10.9 TYPE 1 DIABETES MELLITUS WITHOUT COMPLICATION (HCC): ICD-10-CM

## 2022-10-03 DIAGNOSIS — E10.9 TYPE 1 DIABETES MELLITUS WITHOUT COMPLICATION (HCC): Primary | ICD-10-CM

## 2022-10-03 DIAGNOSIS — Z91.199 NON-COMPLIANT PATIENT: ICD-10-CM

## 2022-10-03 LAB
CHOLESTEROL, TOTAL: 168 MG/DL (ref 0–199)
CREATININE URINE: 71 MG/DL (ref 40–278)
HBA1C MFR BLD: 11 %
HDLC SERPL-MCNC: 60 MG/DL
LDL CHOLESTEROL CALCULATED: 101 MG/DL (ref 0–99)
MICROALBUMIN UR-MCNC: <12 MG/L
MICROALBUMIN/CREAT UR-RTO: ABNORMAL (ref 0–30)
TRIGL SERPL-MCNC: 35 MG/DL (ref 0–149)
VITAMIN D 25-HYDROXY: 17 NG/ML (ref 30–100)
VLDLC SERPL CALC-MCNC: 7 MG/DL

## 2022-10-03 PROCEDURE — 3046F HEMOGLOBIN A1C LEVEL >9.0%: CPT | Performed by: NURSE PRACTITIONER

## 2022-10-03 PROCEDURE — 83036 HEMOGLOBIN GLYCOSYLATED A1C: CPT | Performed by: NURSE PRACTITIONER

## 2022-10-03 PROCEDURE — 99214 OFFICE O/P EST MOD 30 MIN: CPT | Performed by: NURSE PRACTITIONER

## 2022-10-03 RX ORDER — FLASH GLUCOSE SENSOR
KIT MISCELLANEOUS
Qty: 3 EACH | Refills: 3 | Status: SHIPPED | OUTPATIENT
Start: 2022-10-03 | End: 2022-10-04 | Stop reason: ALTCHOICE

## 2022-10-03 RX ORDER — PEN NEEDLE, DIABETIC 32GX 5/32"
NEEDLE, DISPOSABLE MISCELLANEOUS
Qty: 250 EACH | Refills: 5 | Status: SHIPPED | OUTPATIENT
Start: 2022-10-03

## 2022-10-03 RX ORDER — FLASH GLUCOSE SCANNING READER
EACH MISCELLANEOUS
Qty: 1 EACH | Refills: 0 | Status: SHIPPED | OUTPATIENT
Start: 2022-10-03 | End: 2022-10-04 | Stop reason: ALTCHOICE

## 2022-10-03 NOTE — PROGRESS NOTES
[address] [place] [address] Department of Endocrinology Diabetes and Metabolism   [address] [facility] [address]   Phone: [phone]  Fax: [phone]    Date of Service: 10/3/2022    Primary Care Physician: No primary care provider on file. .  Provider: ALBERTO Myers NP      Reason for the visit:  Type I DM     History of Present Illness: The history is provided by the patient. No  was used. Accuracy of the patient data is excellent. Dre Kevin is a very pleasant 40 y.o. male seen today for diabetes management     Last OV 11/24/2020    Dre Kevin was diagnosed with diabetes at age 16 and he is currently on 70/30 28 units in AM and at HS 28 units     At last OV was odered  basaglar 54 U daily , Humalog 15 units + ss 3:50>150 basaglar 54 U daily    He has been checking BS 4x times a day   BS varying   FBS   Per pt  most readings varying per glucometer review    Lab Results   Component Value Date/Time    LABA1C 11.0 10/03/2022 02:06 PM    LABA1C 7.3 10/08/2020 09:36 AM    LABA1C 10.8 01/30/2020 10:51 AM    LABA1C 9.5 10/04/2018 12:00 PM     The patient has not been mindful of what has been eating and following diabetic diet as encouraged  I reviewed current medications and the patient has no issues with diabetes medications  Last eye exam was multiple years ago  The patient seeing his without a podiatrist and performs his own foot care  Microvascular complications:  No Retinopathy, Nephropathy or Neuropathy   Macrovascular complications: no CAD, PVD, or Stroke   Refuses Flushot      PAST MEDICAL HISTORY   Past Medical History:   Diagnosis Date    Diabetes mellitus type 1 ([address]      PAST SURGICAL HISTORY   Past Surgical History:   Procedure Laterality Date    APPENDECTOMY       SOCIAL HISTORY   Tobacco:   reports that he has never smoked. He has never used smokeless tobacco.  Alcol:   reports no history of alcohol use.   Illicit Drugs:   reports no history of drug use. FAMILY HISTORY   Family History   Problem Relation Age of Onset    Cancer Maternal Grandmother     Hypertension Maternal Grandmother     Diabetes Paternal Grandfather      ALLERGIES AND DRUG REACTIONS   No Known Allergies    CURRENT MEDICATIONS     Current Outpatient Medications   Medication Sig Dispense Refill    Continuous Blood Gluc Sensor (FREESTYLE JORDAN 2 SENSOR) MISC Apply every 14 days 3 each 3    Continuous Blood Gluc  (FREESTYLE JORDAN 2 READER) SAMANTA For monitoring 1 each 0    insulin aspart protamine-insulin aspart (NOVOLOG 70/30) (70-30) 100 UNIT/ML injection Inject 34 units at breakfast and 28 units at dinner 7 Adjustable Dose Pre-filled Pen Syringe 3    Insulin Pen Needle (BD PEN NEEDLE DRAKE U/F) 32G X 4 MM MISC Uses with insulin 4 times a day 250 each 5    ondansetron (ZOFRAN ODT) 4 MG disintegrating tablet Take 1 tablet by mouth every 8 hours as needed for Nausea or Vomiting (Patient not taking: Reported on 10/3/2022) 12 tablet 0    Continuous Blood Gluc  (FREESTYLE JORDAN 2 READER SYSTM) SAMANTA Freestyle Jordan-2 reader device (Patient not taking: Reported on 10/3/2022) 1 Device 0    Continuous Blood Gluc Sensor (FREESTYLE JORDAN 2 SENSOR SYSTM) MISC Change sensor every 14 days (Patient not taking: Reported on 10/3/2022) 3 each 5    blood glucose monitor strips One-Touch mini strips. Checks 5 times/day before meals and at bedtime and as needed for symptoms of irregular blood glucose (Patient not taking: Reported on 10/3/2022) 250 strip 5    ondansetron (ZOFRAN) 4 MG tablet Take 1 tablet by mouth 3 times daily as needed for Nausea or Vomiting (Patient not taking: Reported on 10/3/2022) 15 tablet 0    Lancets MISC 1 each by Does not apply route 4 times daily (Patient not taking: Reported on 10/3/2022) 300 each 5     No current facility-administered medications for this visit.        Review of Systems  Constitutional: No fever, no chills, no diaphoresis, no generalized weakness. HEENT: No blurred vision, No sore throat, no ear pain, no hair loss  Neck: denied any neck swelling, difficulty swallowing,   Cardio-pulmonary: No CP, SOB or palpitation, No orthopnea or PND. No cough or wheezing. GI: No N/V/D, no constipation, No abdominal pain, no melena or hematochezia   : Denied any dysuria, hematuria, flank pain, discharge, or incontinence. Skin: denied any rash, ulcer, Hirsute, or hyperpigmentation. MSK: denied any joint deformity, joint pain/swelling, muscle pain, or back pain. Neuro: no numbness, no tingling, no weakness    OBJECTIVE    /68   Pulse 73   Ht 5' 10\" (1.778 m)   Wt 177 lb (80.3 kg)   BMI 25.40 kg/m²   BP Readings from Last 4 Encounters:   10/03/22 106/68   07/30/22 118/82   11/24/20 124/68   10/08/20 128/78     Wt Readings from Last 6 Encounters:   10/03/22 177 lb (80.3 kg)   07/30/22 185 lb (83.9 kg)   11/24/20 214 lb (97.1 kg)   10/08/20 220 lb 6.4 oz (100 kg)   07/21/20 210 lb (95.3 kg)   01/30/20 193 lb (87.5 kg)       Physical examination:  General: awake alert, oriented x3, no abnormal position or movements. HEENT: normocephalic non-traumatic, no exophthalmos   Neck: supple, no LN enlargement, no thyromegaly, no thyroid tenderness, no JVD. Pulm: Clear equal air entry no added sounds, no wheezing or rhonchi    CVS: S1 + S2, no murmur, no heave. Dorsalis pedis pulse palpable   Abd: soft lax, no tenderness, no organomegaly, audible bowel sounds. Skin: warm, no lesions, no rash.  No callus, no Ulcers, No acanthosis nigricans   Neuro: CN intact, sensation normal bilateral , muscle power normal  Psych: normal mood, and affect      Review of Laboratory Data:  I have reviewed the following:  Lab Results   Component Value Date/Time    WBC 17.7 (H) 07/30/2022 10:16 AM    RBC 5.35 07/30/2022 10:16 AM    HGB 16.6 (H) 07/30/2022 10:16 AM    HCT 46.8 07/30/2022 10:16 AM    MCV 87.5 07/30/2022 10:16 AM    MCH 31.0 07/30/2022 10:16 AM    MCHC 35.5 (H) 07/30/2022 10:16 AM    RDW 12.0 07/30/2022 10:16 AM     07/30/2022 10:16 AM    MPV 10.8 07/30/2022 10:16 AM      Lab Results   Component Value Date/Time     07/30/2022 10:16 AM    K 3.8 07/30/2022 10:16 AM    CO2 19 (L) 07/30/2022 10:16 AM    BUN 15 07/30/2022 10:16 AM    CALCIUM 9.2 07/30/2022 10:16 AM      No results found for: TSH, T4FREE, V9NSLBT, FT3, H2DWPGX, TSI, TPOABS, THGAB  Lab Results   Component Value Date/Time    LABA1C 11.0 10/03/2022 02:06 PM    GLUCOSE 296 07/30/2022 10:16 AM    MALBCR 17.0 10/04/2018 12:00 PM    LABMICR 16.8 10/04/2018 12:00 PM    LABCREA 99 10/04/2018 12:00 PM     Lab Results   Component Value Date/Time    CHOL 204 10/04/2018 12:00 PM    TRIG 143 10/04/2018 12:00 PM    HDL 48 10/04/2018 12:00 PM     No results found for: [address] [facility] [address] Records/Labs/Images review:   I personally reviewed and summarized previous records   All labs were reviewed independently     [address] Daiana Jones, a 40 y.o.-old male seen in for the following issues     Diabetes Mellitus Type 1   Patient's diabetes is controlled. A1c  11.0%  Pt has been noncompliant for f/u care and running our of insulin  Change insulin regimen to 70/30 34 units at breakfast and 28 units at dinner. Discussed the importance of taking insulin as prescribed and following diabetic diet   Advised to check blood sugars 4 times a day before meals and at bedtime and fax the results to our office in a week. Pt would benefit from CGM as a Type 1 DM   Discussed with patient A1c and blood sugar goals   Patient will need routine diabetes maintenance and prevention     Noncompliance  Discussed with patient the importance of eating consistent carbohydrate meals, avoiding high glycemic index food.  Also, discussed with patient the risk and negative consequences of dietary noncompliance on blood glucose control, blood pressure and weight     Vit D deficiency   Will check levels    Return in about 2 months (around 12/3/2022) for type 1 DM. The above issues were reviewed with the patient who understood and agreed with the plan. Thank you for allowing us to participate in the care of this patient. Please do not hesitate to contact us with any additional questions. Diagnosis Orders   1. Type 1 diabetes mellitus without complication (HCC)  POCT glycosylated hemoglobin (Hb A1C)    Insulin Pen Needle (BD PEN NEEDLE DRAKE U/F) 32G X 4 MM MISC    LIPID PANEL    MICROALBUMIN / CREATININE URINE RATIO      2. Non-compliant patient        3. Vitamin D deficiency  Vitamin D 25 Hydroxy            [address] [place] [address], APRN - NP    [address] [address]   Phone: [phone]  Fax: [phone]  --------------------------------------------  An electronic signature was used to authenticate this note.  [address] 10/3/2022 at 2:30 PM

## 2022-10-04 DIAGNOSIS — E10.9 TYPE 1 DIABETES MELLITUS WITHOUT COMPLICATION (HCC): Primary | ICD-10-CM

## 2022-10-04 RX ORDER — INSULIN HUMAN 100 [IU]/ML
INJECTION, SUSPENSION SUBCUTANEOUS
Qty: 7 ADJUSTABLE DOSE PRE-FILLED PEN SYRINGE | Refills: 6 | Status: SHIPPED | OUTPATIENT
Start: 2022-10-04

## 2022-10-06 ENCOUNTER — TELEPHONE (OUTPATIENT)
Dept: ENDOCRINOLOGY | Age: 37
End: 2022-10-06

## 2022-10-06 NOTE — TELEPHONE ENCOUNTER
----- Message from ALBERTO Marino NP sent at 10/4/2022  8:09 AM EDT -----  Please advise pt that I have reviewed his Labs and her Vit D is low.  I advise him to take Vit D 2000 iu - 2 capsules daily - can be purchased over the counter

## 2022-12-05 ENCOUNTER — OFFICE VISIT (OUTPATIENT)
Dept: ENDOCRINOLOGY | Age: 37
End: 2022-12-05
Payer: COMMERCIAL

## 2022-12-05 VITALS
BODY MASS INDEX: 26.2 KG/M2 | DIASTOLIC BLOOD PRESSURE: 79 MMHG | SYSTOLIC BLOOD PRESSURE: 117 MMHG | HEART RATE: 57 BPM | HEIGHT: 70 IN | WEIGHT: 183 LBS

## 2022-12-05 DIAGNOSIS — E55.9 VITAMIN D DEFICIENCY: ICD-10-CM

## 2022-12-05 DIAGNOSIS — Z91.199 NON-COMPLIANT PATIENT: ICD-10-CM

## 2022-12-05 DIAGNOSIS — E10.65 TYPE 1 DIABETES MELLITUS WITH HYPERGLYCEMIA (HCC): Primary | ICD-10-CM

## 2022-12-05 PROCEDURE — 3046F HEMOGLOBIN A1C LEVEL >9.0%: CPT | Performed by: NURSE PRACTITIONER

## 2022-12-05 PROCEDURE — 99214 OFFICE O/P EST MOD 30 MIN: CPT | Performed by: NURSE PRACTITIONER

## 2022-12-05 NOTE — PROGRESS NOTES
[address] [facility] Department of Endocrinology Diabetes and Metabolism   [address] [facility] [address]   Phone: [phone]  Fax: [phone]    Date of Service: 12/5/2022    Primary Care Physician: No primary care provider on file. .  Provider: ALBERTO Marinelli NP      Reason for the visit:  Type I DM     History of Present Illness: The history is provided by the patient. No  was used. Accuracy of the patient data is excellent. Dre Campuzano is a very pleasant 40 y.o. male seen today for diabetes management     Last OV 11/24/2020 prior to the last visit in 9/2022    Dre Campuzano was diagnosed with diabetes at age 16 and he is currently on 70/30 24 units in AM and at HS 20 units  Since last Ov was having lows in the afternoons , he reduced his insulin on his own     He did not inform the office if he is having lows     He has been checking BS 2 x times a day     No glucometer today    Per recall 's+      Lab Results   Component Value Date/Time    LABA1C 11.0 10/03/2022 02:06 PM    LABA1C 7.3 10/08/2020 09:36 AM    LABA1C 10.8 01/30/2020 10:51 AM    LABA1C 9.5 10/04/2018 12:00 PM     The patient has not been mindful of what has been eating and following diabetic diet as encouraged  I reviewed current medications and the patient has no issues with diabetes medications  Last eye exam was multiple years ago  The patient seeing his without a podiatrist and performs his own foot care  Microvascular complications:  No Retinopathy, Nephropathy or Neuropathy   Macrovascular complications: no CAD, PVD, or Stroke   Refuses Flushot      PAST MEDICAL HISTORY   Past Medical History:   Diagnosis Date    Diabetes mellitus type 1 ([address]      PAST SURGICAL HISTORY   Past Surgical History:   Procedure Laterality Date    APPENDECTOMY       SOCIAL HISTORY   Tobacco:   reports that he has never smoked.  He has never used smokeless tobacco.  Alcol:   reports no history of alcohol use. Illicit Drugs:   reports no history of drug use. FAMILY HISTORY   Family History   Problem Relation Age of Onset    Cancer Maternal Grandmother     Hypertension Maternal Grandmother     Diabetes Paternal Grandfather      ALLERGIES AND DRUG REACTIONS   No Known Allergies    CURRENT MEDICATIONS     Current Outpatient Medications   Medication Sig Dispense Refill    Insulin NPH Isophane & Regular (HUMULIN 70/30 KWIKPEN) (70-30) 100 UNIT per ML injection pen Inject 34 units at breakfast and 28 units at dinner 7 Adjustable Dose Pre-filled Pen Syringe 6    Insulin Pen Needle (BD PEN NEEDLE DRAKE U/F) 32G X 4 MM MISC Uses with insulin 4 times a day 250 each 5     No current facility-administered medications for this visit. Review of Systems  Constitutional: No fever, no chills, no diaphoresis, no generalized weakness. HEENT: No blurred vision, No sore throat, no ear pain, no hair loss  Neck: denied any neck swelling, difficulty swallowing,   Cardio-pulmonary: No CP, SOB or palpitation, No orthopnea or PND. No cough or wheezing. GI: No N/V/D, no constipation, No abdominal pain, no melena or hematochezia   : Denied any dysuria, hematuria, flank pain, discharge, or incontinence. Skin: denied any rash, ulcer, Hirsute, or hyperpigmentation. MSK: denied any joint deformity, joint pain/swelling, muscle pain, or back pain. Neuro: no numbness, no tingling, no weakness    OBJECTIVE    There were no vitals taken for this visit. BP Readings from Last 4 Encounters:   10/03/22 106/68   07/30/22 118/82   11/24/20 124/68   10/08/20 128/78     Wt Readings from Last 6 Encounters:   10/03/22 177 lb (80.3 kg)   07/30/22 185 lb (83.9 kg)   11/24/20 214 lb (97.1 kg)   10/08/20 220 lb 6.4 oz (100 kg)   07/21/20 210 lb (95.3 kg)   01/30/20 193 lb (87.5 kg)       Physical examination:  General: awake alert, oriented x3, no abnormal position or movements.   HEENT: normocephalic non-traumatic, no exophthalmos Neck: supple, no LN enlargement, no thyromegaly, no thyroid tenderness, no JVD. Pulm: Clear equal air entry no added sounds, no wheezing or rhonchi    CVS: S1 + S2, no murmur, no heave. Dorsalis pedis pulse palpable   Abd: soft lax, no tenderness, no organomegaly, audible bowel sounds. Skin: warm, no lesions, no rash.  No callus, no Ulcers, No acanthosis nigricans   Neuro: CN intact, sensation normal bilateral , muscle power normal  Psych: normal mood, and affect      Review of Laboratory Data:  I have reviewed the following:  Lab Results   Component Value Date/Time    WBC 17.7 (H) 07/30/2022 10:16 AM    RBC 5.35 07/30/2022 10:16 AM    HGB 16.6 (H) 07/30/2022 10:16 AM    HCT 46.8 07/30/2022 10:16 AM    MCV 87.5 07/30/2022 10:16 AM    MCH 31.0 07/30/2022 10:16 AM    MCHC 35.5 (H) 07/30/2022 10:16 AM    RDW 12.0 07/30/2022 10:16 AM     07/30/2022 10:16 AM    MPV 10.8 07/30/2022 10:16 AM      Lab Results   Component Value Date/Time     07/30/2022 10:16 AM    K 3.8 07/30/2022 10:16 AM    CO2 19 (L) 07/30/2022 10:16 AM    BUN 15 07/30/2022 10:16 AM    CALCIUM 9.2 07/30/2022 10:16 AM      No results found for: TSH, T4FREE, M2FNYVP, FT3, G4SVHDM, TSI, TPOABS, THGAB  Lab Results   Component Value Date/Time    LABA1C 11.0 10/03/2022 02:06 PM    GLUCOSE 296 07/30/2022 10:16 AM    MALBCR - 10/03/2022 02:37 PM    LABMICR <12.0 10/03/2022 02:37 PM    LABCREA 71 10/03/2022 02:37 PM     Lab Results   Component Value Date/Time    CHOL 168 10/03/2022 02:37 PM    CHOL 204 10/04/2018 12:00 PM    TRIG 35 10/03/2022 02:37 PM    TRIG 143 10/04/2018 12:00 PM    HDL 60 10/03/2022 02:37 PM    HDL 48 10/04/2018 12:00 PM     Lab Results   Component Value Date/Time    IQIB29 17 10/03/2022 02:37 PM       Medical Records/Labs/Images review:   I personally reviewed and summarized previous records   All labs were reviewed independently     [address] Jones, a 40 y.o.-old male seen in for the following issues     Diabetes Mellitus Type 1   Patient's diabetes is unknown   No BS data available  - difficult to make changes without BS results   A1c via fingerstick supplies  not available today  A1c by lab ordered but pt refusing as he owes on his bill  Change insulin regimen to 70/30 24 units at breakfast and 28 units at dinner. Discussed the importance of taking insulin as prescribed and following diabetic diet   Advised to check blood sugars 4 times a day before meals and at bedtime and fax the results to our office in a week. Pt would benefit from CGM as a Type 1 DM  -  Gee Castellanos was not covered  Advised to call insurance for coverage options   Discussed with patient A1c and blood sugar goals   Patient will need routine diabetes maintenance and prevention     Noncompliance  Discussed with patient the importance of eating consistent carbohydrate meals, avoiding high glycemic index food. Also, discussed with patient the risk and negative consequences of dietary noncompliance on blood glucose control, blood pressure and weight     Vit D deficiency   Last levels low  On replacement therapy    Return in about 2 months (around 2/5/2023) for Type 1 DM. The above issues were reviewed with the patient who understood and agreed with the plan. Thank you for allowing us to participate in the care of this patient. Please do not hesitate to contact us with any additional questions. Diagnosis Orders   1. Type 1 diabetes mellitus with hyperglycemia (HCC)        2. Non-compliant patient        3. Vitamin D deficiency          [address], APRN - NP    [facility])   [address] [place] [address], [address] [place] [address],[id number]   Phone: [phone]  Fax: [phone]  --------------------------------------------  An electronic signature was used to authenticate this note.  [address] [place], APRN - NPon 12/5/2022 at 2:41 PM

## 2023-03-22 ENCOUNTER — OFFICE VISIT (OUTPATIENT)
Dept: ENDOCRINOLOGY | Age: 38
End: 2023-03-22
Payer: COMMERCIAL

## 2023-03-22 VITALS
HEART RATE: 84 BPM | DIASTOLIC BLOOD PRESSURE: 80 MMHG | HEIGHT: 70 IN | WEIGHT: 186 LBS | BODY MASS INDEX: 26.63 KG/M2 | SYSTOLIC BLOOD PRESSURE: 134 MMHG | OXYGEN SATURATION: 100 %

## 2023-03-22 DIAGNOSIS — E10.65 TYPE 1 DIABETES MELLITUS WITH HYPERGLYCEMIA (HCC): Primary | ICD-10-CM

## 2023-03-22 DIAGNOSIS — E55.9 VITAMIN D DEFICIENCY: ICD-10-CM

## 2023-03-22 LAB — HBA1C MFR BLD: 8.8 %

## 2023-03-22 PROCEDURE — 83036 HEMOGLOBIN GLYCOSYLATED A1C: CPT | Performed by: NURSE PRACTITIONER

## 2023-03-22 PROCEDURE — 3052F HG A1C>EQUAL 8.0%<EQUAL 9.0%: CPT | Performed by: NURSE PRACTITIONER

## 2023-03-22 PROCEDURE — 99214 OFFICE O/P EST MOD 30 MIN: CPT | Performed by: NURSE PRACTITIONER

## 2023-03-22 NOTE — PROGRESS NOTES
Mellitus Type 1   Patient's diabetes is 8.8% noncompliance with BS checks  Adjust  insulin regimen to 70/30 30 units at breakfast and 30  units at dinner. Discussed the importance of taking insulin as prescribed and following diabetic diet   Advised to check blood sugars 4 times a day before meals and at bedtime and fax the results to our office in a week. Pt would benefit from CGM as a Type 1 DM  - states Sandi Lung was not covered  Advised to call insurance for coverage options   Discussed with patient A1c and blood sugar goals   Patient will need routine diabetes maintenance and prevention     Noncompliance  Discussed with patient the importance of eating consistent carbohydrate meals, avoiding high glycemic index food. Also, discussed with patient the risk and negative consequences of dietary noncompliance on blood glucose control, blood pressure and weight     Vit D deficiency   Last levels low  On replacement therapy    Return in about 3 months (around 6/22/2023) for Type 1 DM . The above issues were reviewed with the patient who understood and agreed with the plan. Thank you for allowing us to participate in the care of this patient. Please do not hesitate to contact us with any additional questions. Diagnosis Orders   1. Type 1 diabetes mellitus with hyperglycemia (HCC)  POCT glycosylated hemoglobin (Hb A1C)      2. Vitamin D deficiency            ALBERTO Adams NP    [facility])   [address] [facility], [address] [place] [address],[facility] [id number]   Phone: [phone]  Fax: [phone]  --------------------------------------------  An electronic signature was used to authenticate this note.  ALBERTO Adams NPon 3/22/2023 at 11:51 AM

## 2023-05-15 DIAGNOSIS — E10.9 TYPE 1 DIABETES MELLITUS WITHOUT COMPLICATION (HCC): ICD-10-CM

## 2023-05-15 RX ORDER — INSULIN HUMAN 100 [IU]/ML
INJECTION, SUSPENSION SUBCUTANEOUS
Qty: 7 ADJUSTABLE DOSE PRE-FILLED PEN SYRINGE | Refills: 6 | Status: SHIPPED | OUTPATIENT
Start: 2023-05-15

## 2023-05-15 NOTE — TELEPHONE ENCOUNTER
Pts partner called stated that pt is very ill with a stomach bug and he is all of insulin and needs a refill please

## 2024-04-18 ENCOUNTER — OFFICE VISIT (OUTPATIENT)
Dept: ENDOCRINOLOGY | Age: 39
End: 2024-04-18
Payer: COMMERCIAL

## 2024-04-18 VITALS
BODY MASS INDEX: 20.04 KG/M2 | SYSTOLIC BLOOD PRESSURE: 123 MMHG | WEIGHT: 140 LBS | DIASTOLIC BLOOD PRESSURE: 78 MMHG | HEIGHT: 70 IN

## 2024-04-18 DIAGNOSIS — E55.9 VITAMIN D DEFICIENCY: ICD-10-CM

## 2024-04-18 DIAGNOSIS — E10.9 TYPE 1 DIABETES MELLITUS WITHOUT COMPLICATION (HCC): Primary | ICD-10-CM

## 2024-04-18 DIAGNOSIS — Z91.199 NON-COMPLIANT PATIENT: ICD-10-CM

## 2024-04-18 LAB — HBA1C MFR BLD: 11.1 %

## 2024-04-18 PROCEDURE — 3046F HEMOGLOBIN A1C LEVEL >9.0%: CPT | Performed by: NURSE PRACTITIONER

## 2024-04-18 PROCEDURE — 99214 OFFICE O/P EST MOD 30 MIN: CPT | Performed by: NURSE PRACTITIONER

## 2024-04-18 PROCEDURE — 83036 HEMOGLOBIN GLYCOSYLATED A1C: CPT | Performed by: NURSE PRACTITIONER

## 2024-04-18 NOTE — PROGRESS NOTES
[facility] [employer] [facility] Department of Endocrinology Diabetes and Metabolism   [address] [facility], [place] [address]  Phone: [phone]  Fax: [phone]    Date of Service: 4/30/2024    Primary Care Physician: No primary care provider on file..  Provider: ALBERTO Fermin NP      Reason for the visit:  Type I DM     History of Present Illness:  The history is provided by the patient. No  was used. Accuracy of the patient data is excellent.    Dre Nuñez is a very pleasant 38 y.o. male seen today for diabetes management     Last OV 3/22/23,  9/2022, and 11/24/2020    Dre Nuñez was diagnosed with diabetes at age 14 and he is currently on 70/30 36 units in AM and at HS 36 units    Pt self-adjusts his insulin     He has not been checking BS       Lab Results   Component Value Date/Time    LABA1C 11.1 04/18/2024 04:01 PM    LABA1C 8.8 03/22/2023 11:30 AM    LABA1C 11.0 10/03/2022 02:06 PM    LABA1C 7.3 10/08/2020 09:36 AM     He has had no low BS   The patient has not been mindful of what has been eating and following diabetic diet as encouraged  I reviewed current medications and the patient has no issues with diabetes medications  Last eye exam was multiple years ago  The patient  performs his own foot care  Microvascular complications:  No Retinopathy, Nephropathy or Neuropathy   Macrovascular complications: no CAD, PVD, or Stroke   Refuses Flushot      PAST MEDICAL HISTORY   Past Medical History:   Diagnosis Date    Diabetes mellitus type 1 (HCC)      PAST SURGICAL HISTORY   Past Surgical History:   Procedure Laterality Date    APPENDECTOMY       SOCIAL HISTORY   Tobacco:   reports that he has never smoked. He has never used smokeless tobacco.  Alcol:   reports no history of alcohol use.  Illicit Drugs:   reports no history of drug use.    FAMILY HISTORY   Family History   Problem Relation Age of Onset    Cancer Maternal Grandmother

## 2024-04-23 RX ORDER — INSULIN ASPART 100 [IU]/ML
INJECTION, SOLUTION INTRAVENOUS; SUBCUTANEOUS
Qty: 40 ML | Refills: 3 | Status: SHIPPED | OUTPATIENT
Start: 2024-04-23

## 2024-05-06 RX ORDER — INSULIN LISPRO 100 [IU]/ML
INJECTION, SOLUTION INTRAVENOUS; SUBCUTANEOUS
Qty: 40 ML | Refills: 3 | Status: SHIPPED
Start: 2024-05-06 | End: 2024-05-06 | Stop reason: SDUPTHER

## 2024-05-06 RX ORDER — INSULIN LISPRO 100 [IU]/ML
INJECTION, SOLUTION INTRAVENOUS; SUBCUTANEOUS
Qty: 40 ML | Refills: 3 | Status: SHIPPED | OUTPATIENT
Start: 2024-05-06

## 2024-07-22 NOTE — TELEPHONE ENCOUNTER
Patient using 2ml to fill   Due for refill 7/31/24 but doesn't have enough     Spoke with pharmacy, they had the new script on hold and are filling it, will be available for  today

## 2024-08-08 ENCOUNTER — OFFICE VISIT (OUTPATIENT)
Dept: ENDOCRINOLOGY | Age: 39
End: 2024-08-08

## 2024-08-08 VITALS — BODY MASS INDEX: 27.35 KG/M2 | HEIGHT: 70 IN | WEIGHT: 191 LBS

## 2024-08-08 DIAGNOSIS — E55.9 VITAMIN D DEFICIENCY: ICD-10-CM

## 2024-08-08 DIAGNOSIS — E10.9 TYPE 1 DIABETES MELLITUS WITHOUT COMPLICATION (HCC): Primary | ICD-10-CM

## 2024-08-08 LAB — HBA1C MFR BLD: 7.1 %

## 2024-10-07 ENCOUNTER — TELEPHONE (OUTPATIENT)
Dept: ENDOCRINOLOGY | Age: 39
End: 2024-10-07

## 2025-04-08 RX ORDER — INSULIN LISPRO 100 [IU]/ML
INJECTION, SOLUTION INTRAVENOUS; SUBCUTANEOUS
Qty: 40 ML | Refills: 0 | Status: SHIPPED | OUTPATIENT
Start: 2025-04-08

## 2025-04-08 NOTE — TELEPHONE ENCOUNTER
8/8/24: Adjust pump settings:  Basal  0.8, CR 11, ISF  46, -150, AIT  2  Messaged patient to schedule follow up

## 2025-08-14 ENCOUNTER — OFFICE VISIT (OUTPATIENT)
Dept: ENDOCRINOLOGY | Age: 40
End: 2025-08-14

## 2025-08-14 VITALS
HEART RATE: 97 BPM | SYSTOLIC BLOOD PRESSURE: 169 MMHG | BODY MASS INDEX: 25.34 KG/M2 | WEIGHT: 181 LBS | OXYGEN SATURATION: 95 % | DIASTOLIC BLOOD PRESSURE: 113 MMHG | RESPIRATION RATE: 20 BRPM | HEIGHT: 71 IN | TEMPERATURE: 98.6 F

## 2025-08-14 DIAGNOSIS — E10.9 TYPE 1 DIABETES MELLITUS WITHOUT COMPLICATION (HCC): Primary | ICD-10-CM

## 2025-08-14 DIAGNOSIS — E55.9 VITAMIN D DEFICIENCY: ICD-10-CM

## 2025-08-14 DIAGNOSIS — Z96.41 INSULIN PUMP IN PLACE: ICD-10-CM

## 2025-08-14 LAB — HBA1C MFR BLD: 8.7 %

## 2025-08-14 RX ORDER — INSULIN LISPRO 100 [IU]/ML
INJECTION, SOLUTION INTRAVENOUS; SUBCUTANEOUS
Qty: 90 ML | Refills: 4 | Status: SHIPPED | OUTPATIENT
Start: 2025-08-14